# Patient Record
Sex: MALE | Race: WHITE | NOT HISPANIC OR LATINO | Employment: UNEMPLOYED | URBAN - METROPOLITAN AREA
[De-identification: names, ages, dates, MRNs, and addresses within clinical notes are randomized per-mention and may not be internally consistent; named-entity substitution may affect disease eponyms.]

---

## 2017-02-24 ENCOUNTER — TRANSFERRED RECORDS (OUTPATIENT)
Dept: HEALTH INFORMATION MANAGEMENT | Facility: CLINIC | Age: 27
End: 2017-02-24

## 2017-02-24 ENCOUNTER — MEDICAL CORRESPONDENCE (OUTPATIENT)
Dept: HEALTH INFORMATION MANAGEMENT | Facility: CLINIC | Age: 27
End: 2017-02-24

## 2017-02-27 DIAGNOSIS — Z79.899 ENCOUNTER FOR LONG-TERM (CURRENT) USE OF MEDICATIONS: Primary | ICD-10-CM

## 2017-02-27 DIAGNOSIS — Z79.899 ENCOUNTER FOR LONG-TERM (CURRENT) USE OF MEDICATIONS: ICD-10-CM

## 2017-02-27 LAB — PROLACTIN SERPL-MCNC: 58.6 NG/ML (ref 0–15)

## 2017-03-02 ENCOUNTER — DOCUMENTATION ONLY (OUTPATIENT)
Dept: LAB | Facility: CLINIC | Age: 27
End: 2017-03-02

## 2017-03-02 DIAGNOSIS — Z79.899 ENCOUNTER FOR MEDICATION REVIEW: Primary | ICD-10-CM

## 2017-03-02 RX ORDER — RISPERIDONE 3 MG/1
TABLET ORAL
COMMUNITY
Start: 2017-03-02 | End: 2018-08-03 | Stop reason: DRUGHIGH

## 2017-03-02 NOTE — PROGRESS NOTES
From:  [address]  [address]  [phone]  Fax [phone]      To: [employer] [place]    Attn: Veronicajemma Joyner    Fax Number: [phone]    Date: 3/2/2017    RE: Devan DRISCOLL Shaun 1990 MRN [id number]      RESULTS FOR YOUR REVIEW:    Orders Only on 02/27/2017   Component Date Value Ref Range Status     Prolactin 02/27/2017 58.6* 0.0 - 15.0 ng/mL Final       COMMENTS:         Thanks,  Leann Norton    Results faxed by Fidel Bolanos

## 2017-03-20 ENCOUNTER — TRANSFERRED RECORDS (OUTPATIENT)
Dept: HEALTH INFORMATION MANAGEMENT | Facility: CLINIC | Age: 27
End: 2017-03-20

## 2017-03-27 DIAGNOSIS — Z79.899 ENCOUNTER FOR LONG-TERM (CURRENT) USE OF MEDICATIONS: Primary | ICD-10-CM

## 2017-05-08 ENCOUNTER — TELEPHONE (OUTPATIENT)
Dept: FAMILY MEDICINE | Facility: CLINIC | Age: 27
End: 2017-05-08

## 2017-06-09 ENCOUNTER — TRANSFERRED RECORDS (OUTPATIENT)
Dept: HEALTH INFORMATION MANAGEMENT | Facility: CLINIC | Age: 27
End: 2017-06-09

## 2017-07-03 ENCOUNTER — TRANSFERRED RECORDS (OUTPATIENT)
Dept: HEALTH INFORMATION MANAGEMENT | Facility: CLINIC | Age: 27
End: 2017-07-03

## 2017-07-31 ENCOUNTER — TELEPHONE (OUTPATIENT)
Dept: FAMILY MEDICINE | Facility: CLINIC | Age: 27
End: 2017-07-31

## 2017-07-31 NOTE — TELEPHONE ENCOUNTER
Called patient, he lives in a center so he had his worker schedule the appointment for him. I transferred her downstairs to make that appointment for 8/8/17.

## 2017-07-31 NOTE — TELEPHONE ENCOUNTER
----- Message from Yu Gomez MD sent at 7/25/2017 10:44 AM CDT -----  Please Call Devan and ask them to schedule an appointment to complete Paper work for [facility] as I have not seen the pt before.

## 2017-08-08 ENCOUNTER — MEDICAL CORRESPONDENCE (OUTPATIENT)
Dept: HEALTH INFORMATION MANAGEMENT | Facility: CLINIC | Age: 27
End: 2017-08-08

## 2017-08-08 ENCOUNTER — OFFICE VISIT (OUTPATIENT)
Dept: FAMILY MEDICINE | Facility: CLINIC | Age: 27
End: 2017-08-08

## 2017-08-08 VITALS
TEMPERATURE: 99.7 F | BODY MASS INDEX: 30.86 KG/M2 | HEIGHT: 66 IN | SYSTOLIC BLOOD PRESSURE: 112 MMHG | WEIGHT: 192 LBS | DIASTOLIC BLOOD PRESSURE: 75 MMHG | HEART RATE: 111 BPM

## 2017-08-08 DIAGNOSIS — Z00.00 ROUTINE HISTORY AND PHYSICAL EXAMINATION OF ADULT: Primary | ICD-10-CM

## 2017-08-08 LAB
% GRANULOCYTES: 59.3 %G (ref 40–75)
ALBUMIN SERPL BCP-MCNC: 4.1 G/DL (ref 3.5–5)
ALP SERPL-CCNC: 81 U/L (ref 45–120)
ALT SERPL W/O P-5'-P-CCNC: 84 U/L (ref 0–45)
ANION GAP SERPL CALCULATED.3IONS-SCNC: 11 MMOL/L (ref 5–18)
AST SERPL-CCNC: 42 U/L (ref 0–40)
BILIRUB SERPL-MCNC: 0.7 MG/DL (ref 0–1)
BUN SERPL-MCNC: 13 MG/DL (ref 8–22)
CALCIUM SERPL-MCNC: 9 MG/DL (ref 8.5–10.5)
CHLORIDE SERPL-SCNC: 101 MMOL/L (ref 98–107)
CHOLEST SERPL-MCNC: 191 MG/DL
CO2 SERPL-SCNC: 23 MMOL/L (ref 22–31)
CREAT SERPL-MCNC: 1.01 MG/DL (ref 0.7–1.3)
FASTING?: NO
GLUCOSE SERPL-MCNC: 102 MG/DL (ref 70–125)
GRANULOCYTES #: 3.3 K/UL (ref 1.6–8.3)
HCT VFR BLD AUTO: 41.8 % (ref 40–53)
HDLC SERPL-MCNC: 31 MG/DL
HEMOGLOBIN: 13.9 G/DL (ref 13.3–17.7)
LDLC SERPL CALC-MCNC: 96 MG/DL
LYMPHOCYTES # BLD AUTO: 1.7 K/UL (ref 0.8–5.3)
LYMPHOCYTES NFR BLD AUTO: 30.8 %L (ref 20–48)
MCH RBC QN AUTO: 31 PG (ref 26.5–35)
MCHC RBC AUTO-ENTMCNC: 33.3 G/DL (ref 32–36)
MCV RBC AUTO: 93.4 FL (ref 78–100)
MID #: 0.5 K/UL (ref 0–2.2)
MID %: 9.9 %M (ref 0–20)
PLATELET # BLD AUTO: 195 K/UL (ref 150–450)
POTASSIUM SERPL-SCNC: 3.8 MMOL/L (ref 3.5–5)
PROT SERPL-MCNC: 7.4 G/DL (ref 6–8)
RBC # BLD AUTO: 4.5 M/UL (ref 4.4–5.9)
SODIUM SERPL-SCNC: 135 MMOL/L (ref 136–145)
TRIGL SERPL-MCNC: 318 MG/DL
WBC # BLD AUTO: 5.5 K/UL (ref 4–11)

## 2017-08-08 ASSESSMENT — ANXIETY QUESTIONNAIRES
GAD7 TOTAL SCORE: 8
1. FEELING NERVOUS, ANXIOUS, OR ON EDGE: MORE THAN HALF THE DAYS
3. WORRYING TOO MUCH ABOUT DIFFERENT THINGS: NOT AT ALL
6. BECOMING EASILY ANNOYED OR IRRITABLE: NEARLY EVERY DAY
7. FEELING AFRAID AS IF SOMETHING AWFUL MIGHT HAPPEN: MORE THAN HALF THE DAYS
2. NOT BEING ABLE TO STOP OR CONTROL WORRYING: SEVERAL DAYS
5. BEING SO RESTLESS THAT IT IS HARD TO SIT STILL: NOT AT ALL

## 2017-08-08 ASSESSMENT — PATIENT HEALTH QUESTIONNAIRE - PHQ9
5. POOR APPETITE OR OVEREATING: NOT AT ALL
SUM OF ALL RESPONSES TO PHQ QUESTIONS 1-9: 14

## 2017-08-08 NOTE — PROGRESS NOTES
"       SUBJECTIVE       Devan Dunn is a 26 year old  male with a PMH significant for:     Patient Active Problem List   Diagnosis     Health Care Home     Active autistic disorder     Schizophreniform disorder (H)     Depression with anxiety     Autism disorder     Schizoaffective disorder (H)     Schizophrenia (H)     He presents for routine physical and completion of paper work for [facility].     Patient presents with group home staff. He is a poor historian butstates he has no concerns and feel healthy. The staff notes no known issues.     Mr Dunn likes his living situation and feels safe at home.      PMH, Medications and Allergies were reviewed and updated as needed.        REVIEW OF SYSTEMS     CONSTITUTIONAL: NEGATIVE for fever, chills, change in weight  INTEGUMENTARY/SKIN: NEGATIVE for worrisome rashes, moles or lesions  EYES: NEGATIVE for vision changes or irritation  ENT/MOUTH: NEGATIVE for ear, mouth and throat problems  RESP: NEGATIVE for significant cough or SOB  BREAST: NEGATIVE for masses, tenderness or discharge  CV: NEGATIVE for chest pain, palpitations or peripheral edema  GI: NEGATIVE for nausea, abdominal pain, heartburn, or change in bowel habits  : NEGATIVE for frequency, dysuria, or hematuria  MUSCULOSKELETAL: NEGATIVE for significant arthralgias or myalgia  NEURO: NEGATIVE for weakness, dizziness or paresthesias   ENDOCRINE: NEGATIVE for temperature intolerance, skin/hair changes  HEME/ALLERGY: NEGATIVE for bleeding problems        OBJECTIVE     Vitals:    08/08/17 1551   BP: 112/75   Pulse: 111   Temp: 99.7  F (37.6  C)   TempSrc: Oral   Weight: 192 lb (87.1 kg)   Height: 5' 5.5\" (166.4 cm)     Body mass index is 31.46 kg/(m^2).    Constitutional: Awake, alert, cooperative, no apparent distress, and appears stated age.  Eyes: Lids and lashes normal, pupils equal, round and reactive to light, extra ocular muscles intact, sclera clear, conjunctiva normal.  ENT: " Normocephalic, without obvious abnormality, atramatic, sinuses nontender on palpation, external ears without lesions, oral pharynx with moist mucus membranes, tonsils without erythema or exudates, gums normal and good dentition.  Neck: Supple, symmetrical, trachea midline, no adenopathy, thyroid symmetric, not enlarged and no tenderness, skin normal.  Hematologic / Lymphatic: No cervical lymphadenopathy and no supraclavicular lymphadenopathy.  Lungs: No increased work of breathing, good air exchange, clear to auscultation bilaterally, no crackles or wheezing.  Cardiovascular: Regular rate and rhythm, normal S1 and S2, no S3 or S4, and no murmur noted.  Abdomen: normal bowel sounds, soft, non-distended, non-tender, no masses palpated, no hepatosplenomegally.  Musculoskeletal: No redness, warmth, or swelling of the joints.  Full range of motion noted.  Motor strength is 5 out of 5 all extremities bilaterally.  Tone is normal.  Neurologic: Awake, alert, oriented to name, place and time.  Cranial nerves II-XII are grossly intact.  Motor is 5 out of 5 bilaterally.    Neuropsychiatric: Very present, flat affect, impaired insight, limited eye contact  Skin: No rashes, erythema, pallor, petechia or purpura.    PHQ-9: 14 and CANDI-7: 8    ASSESSMENT AND PLAN   Routine history and physical examination of adult: Patient is taking antipsychotics. Needs yearly labs. Has gained 11 lb since last visit. Noted this on group home form.   -     CBC with Diff Plt ([facility])  -     Glycosylated Hgb A1C ([facility])  -     Comprehensive Metabolic ([facility]) - Results > 1 hr  -     Lipid [place] ([facility]) - Results > 1hr      RTC in 1 year for routine physical or sooner if develops new symptoms develop.    I discussed the patient with  who is in agreement with the assessment and plan.   Yu Gomez DO

## 2017-08-08 NOTE — MR AVS SNAPSHOT
After Visit Summary   2017    Devan Dunn    MRN: [id number]           Patient Information     Date Of Birth          1990        Visit Information        Provider Department      2017 3:50 PM Yu Gomez MD [facility]        Today's Diagnoses     Routine history and physical examination of adult    -  1       Follow-ups after your visit        Follow-up notes from your care team     Return in about 1 year (around 2018) for Physical Exam, Lab Work, Routine Visit.      Who to contact     Please call your clinic at [phone] to:    Ask questions about your health    Make or cancel appointments    Discuss your medicines    Learn about your test results    Speak to your doctor   If you have compliments or concerns about an experience at your clinic, or if you wish to file a complaint, please contact [facility] Patient Relations at [phone] or email us at [email or web address]         Additional Information About Your Visit        MyChart Information     [employer] is an electronic gateway that provides easy, online access to your medical records. With [employer], you can request a clinic appointment, read your test results, renew a prescription or communicate with your care team.     To sign up for [employer] visit the website at [email or web address]   You will be asked to enter the access code listed below, as well as some personal information. Please follow the directions to create your username and password.     Your access code is: [email or web address]  Expires: 2017  4:43 PM     Your access code will  in 90 days. If you need help or a new code, please contact your [facility] Clinic or call [phone] for assistance.        Care EveryWhere ID     This is your Care EveryWhere ID. This could be used by other organizations to access your [facility] medical records  [id number]        Your Vitals Were   "   Pulse Temperature Height BMI (Body Mass Index)          111 99.7  F (37.6  C) (Oral) 5' 5.5\" (166.4 cm) 31.46 kg/m2         Blood Pressure from Last 3 Encounters:   08/08/17 112/75   08/04/16 119/81   10/22/15 125/86    Weight from Last 3 Encounters:   08/08/17 192 lb (87.1 kg)   08/04/16 181 lb 4 oz (82.2 kg)   07/27/15 173 lb 11.2 oz (78.8 kg)              We Performed the Following     CBC with Diff Plt ([facility])     Comprehensive Metabolic ([facility]) - Results > 1 hr     Glycosylated Hgb A1C ([facility])     [facility] [place] [facility] - Results > 1hr        Primary Care Provider Office Phone #    Yu Gomez [phone]       [facility] [address] [place] [address]        Equal Access to Services     JUAN PETTY : Hadii seth mills Socarlene, waaxda luqadaha, qaybta kaalmada ethel, ivet mercer . So [facility] [phone].    ATENCIÓN: Si habla español, tiene a cabrera disposición servicios gratuitos de asistencia lingüística. Llame al [phone].    We comply with applicable federal civil rights laws and [place] laws. We do not discriminate on the basis of race, color, national origin, age, disability sex, sexual orientation or gender identity.            Thank you!     Thank you for choosing [facility]  for your care. Our goal is always to provide you with excellent care. Hearing back from our patients is one way we can continue to improve our services. Please take a few minutes to complete the written survey that you may receive in the mail after your visit with us. Thank you!             Your Updated Medication List - Protect others around you: Learn how to safely use, store and throw away your medicines at www.disposemymeds.org.          This list is accurate as of: 8/8/17  5:39 PM.  Always use your most recent med list.                   Brand Name Dispense Instructions for use Diagnosis    benztropine 1 MG tablet    COGENTIN     Take 1 " tablet by mouth 2 times daily.        calcium carbonate 500 MG chewable tablet    TUMS     Take 1 chew tab by mouth 3 times daily as needed.        clonazePAM 0.5 MG tablet    klonoPIN     Take 0.5 mg by mouth 2 times daily as needed        ranitidine 150 MG capsule    ZANTAC    180 capsule    Take 1 capsule (150 mg) by mouth 2 times daily    Gastroesophageal reflux disease without esophagitis       risperiDONE 3 MG tablet    risperDAL     Take 3 mg in the AM and 3.5 mg PM        * traZODone 50 MG tablet    DESYREL     Take by mouth At Bedtime        * traZODone 50 MG tablet    DESYREL    30 tablet    Take 1 tablet (50 mg) by mouth nightly as needed for sleep        * venlafaxine 150 MG 24 hr capsule    EFFEXOR-XR     Take by mouth daily        * venlafaxine 37.5 MG Tb24 24 hr tablet    EFFEXOR-ER    30 each    Take 4 tablets (150 mg) by mouth daily (with breakfast)        * Notice:  This list has 4 medication(s) that are the same as other medications prescribed for you. Read the directions carefully, and ask your doctor or other care provider to review them with you.

## 2017-08-08 NOTE — LETTER
August 8, 2017      Devan Dunn  [address] [address]      To whom it may concern,    Devan Dunn was seen today in clinic.    His active problem list includes:  Patient Active Problem List   Diagnosis     Health Care Home     Active autistic disorder     Schizophreniform disorder (H)     Depression with anxiety     Autism disorder     Schizoaffective disorder (H)     Schizophrenia (H)     His medications include:  Current Outpatient Prescriptions   Medication     risperiDONE (RISPERDAL) 3 MG tablet     ranitidine (ZANTAC) 150 MG capsule     clonazePAM (KLONOPIN) 0.5 MG tablet     traZODone (DESYREL) 50 MG tablet     venlafaxine (EFFEXOR-ER) 37.5 MG TB24     traZODone (DESYREL) 50 MG tablet     venlafaxine (EFFEXOR-XR) 150 MG 24 hr capsule     benztropine (COGENTIN) 1 MG tablet     calcium carbonate (TUMS) 500 MG chewable tablet   Please contact me for questions or concerns.    Sincerely,          Yu Gomez MD

## 2017-08-08 NOTE — LETTER
August 18, 2017      Devan AMERICO Dunn  [address] [address]        Dear Devan,    Please call with results. Let him/the home know that his liver enzymes are slightly elevated. I would like to recheck them in 6 months. Please have them schedule a follow-up appointment in 6 months.     Please see below for your test results.    Resulted Orders   CBC with Diff Plt ([facility])   Result Value Ref Range    WBC 5.5 4.0 - 11.0 K/uL    Lymphocytes # 1.7 0.8 - 5.3 K/uL    % Lymphocytes 30.8 20.0 - 48.0 %L    Mid # 0.5 0.0 - 2.2 K/uL    Mid % 9.9 0.0 - 20.0 %M    GRANULOCYTES # 3.3 1.6 - 8.3 K/uL    % Granulocytes 59.3 40.0 - 75.0 %G    RBC 4.5 4.4 - 5.9 M/uL    Hemoglobin 13.9 13.3 - 17.7 g/dL    Hematocrit 41.8 40.0 - 53.0 %    MCV 93.4 78.0 - 100.0 fL    MCH 31.0 26.5 - 35.0    MCHC 33.3 32.0 - 36.0 g/dL    Platelets 195.0 150.0 - 450.0 K/uL   Glycosylated Hgb A1C ([facility])   Result Value Ref Range    Hemoglobin A1C 5.5 4.2 - 6.1 %    Narrative    Test performed by:  [facility]  [address] [place] [address]   Comprehensive Metabolic ([facility]) - Results > 1 hr   Result Value Ref Range    Sodium 135 (L) 136 - 145 mmol/L    Potassium 3.8 3.5 - 5.0 mmol/L    Chloride 101 98 - 107 mmol/L    CO2, Total 23 22 - 31 mmol/L    Anion Gap 11 5 - 18 mmol/L    Glucose 102 70 - 125 mg/dL    Urea Nitrogen 13 8 - 22 mg/dL    Creatinine 1.01 0.70 - 1.30 mg/dL    GFR Estimate If Black >60 >60 mL/min/1.73m2    GFR Estimate >60 >60 mL/min/1.73m2    Bilirubin Total 0.7 0.0 - 1.0 mg/dL    Calcium 9.0 8.5 - 10.5 mg/dL    Protein Total 7.4 6.0 - 8.0 g/dL    Albumin 4.1 3.5 - 5.0 g/dL    Alkaline Phosphatase 81 45 - 120 U/L    AST (SGOT) 42 (H) 0 - 40 U/L    ALT (SGPT) 84 (H) 0 - 45 U/L    Narrative    Test performed by:  [facility] [address] [place] [address]  Fasting Glucose reference range is 70-99 mg/dL per  American Diabetes Association (ADA) guidelines.   Lipid [place]  ([facility]) - Results > 1hr   Result Value Ref Range    Cholesterol 191 <=199 mg/dL    Triglycerides 318 (H) <=149 mg/dL    HDL Cholesterol 31 (L) >=40 mg/dL    LDL Cholesterol Calculated 96 <=129 mg/dL    Fasting? No     Narrative    Test performed by:  [facility]  [address] [place] [address]       If you have any questions, please call the clinic to make an appointment.    Sincerely,    Yu Gomez MD

## 2017-08-09 LAB — HBA1C MFR BLD: 5.5 % (ref 4.2–6.1)

## 2017-08-09 ASSESSMENT — ANXIETY QUESTIONNAIRES: GAD7 TOTAL SCORE: 8

## 2017-08-17 ENCOUNTER — MEDICAL CORRESPONDENCE (OUTPATIENT)
Dept: HEALTH INFORMATION MANAGEMENT | Facility: CLINIC | Age: 27
End: 2017-08-17

## 2017-09-11 ENCOUNTER — TRANSFERRED RECORDS (OUTPATIENT)
Dept: HEALTH INFORMATION MANAGEMENT | Facility: CLINIC | Age: 27
End: 2017-09-11

## 2017-09-13 ENCOUNTER — TRANSFERRED RECORDS (OUTPATIENT)
Dept: HEALTH INFORMATION MANAGEMENT | Facility: CLINIC | Age: 27
End: 2017-09-13

## 2017-09-13 DIAGNOSIS — Z79.899 ENCOUNTER FOR MEDICATION REVIEW: Primary | ICD-10-CM

## 2017-11-08 ENCOUNTER — TRANSFERRED RECORDS (OUTPATIENT)
Dept: HEALTH INFORMATION MANAGEMENT | Facility: CLINIC | Age: 27
End: 2017-11-08

## 2018-02-08 ENCOUNTER — MEDICAL CORRESPONDENCE (OUTPATIENT)
Dept: HEALTH INFORMATION MANAGEMENT | Facility: CLINIC | Age: 28
End: 2018-02-08

## 2018-07-31 DIAGNOSIS — K21.9 GASTROESOPHAGEAL REFLUX DISEASE WITHOUT ESOPHAGITIS: ICD-10-CM

## 2018-08-03 ENCOUNTER — OFFICE VISIT (OUTPATIENT)
Dept: FAMILY MEDICINE | Facility: CLINIC | Age: 28
End: 2018-08-03

## 2018-08-03 ENCOUNTER — TRANSFERRED RECORDS (OUTPATIENT)
Dept: HEALTH INFORMATION MANAGEMENT | Facility: CLINIC | Age: 28
End: 2018-08-03

## 2018-08-03 VITALS
HEIGHT: 68 IN | WEIGHT: 193 LBS | OXYGEN SATURATION: 96 % | BODY MASS INDEX: 29.25 KG/M2 | TEMPERATURE: 97.9 F | SYSTOLIC BLOOD PRESSURE: 117 MMHG | DIASTOLIC BLOOD PRESSURE: 82 MMHG | RESPIRATION RATE: 20 BRPM | HEART RATE: 119 BPM

## 2018-08-03 DIAGNOSIS — B30.9 ACUTE VIRAL CONJUNCTIVITIS OF RIGHT EYE: Primary | ICD-10-CM

## 2018-08-03 RX ORDER — RISPERIDONE 4 MG/1
4 TABLET ORAL 2 TIMES DAILY
COMMUNITY

## 2018-08-03 RX ORDER — DOXEPIN HYDROCHLORIDE 50 MG/1
50 CAPSULE ORAL AT BEDTIME
COMMUNITY

## 2018-08-03 RX ORDER — TRIHEXYPHENIDYL HYDROCHLORIDE 5 MG/1
5 TABLET ORAL AT BEDTIME
COMMUNITY

## 2018-08-03 ASSESSMENT — PAIN SCALES - GENERAL: PAINLEVEL: NO PAIN (0)

## 2018-08-03 NOTE — NURSING NOTE
Chief Complaint   Patient presents with     RECHECK     [facility]- PINK RIGHT EYE     Abdiel Cope, CMA

## 2018-08-03 NOTE — PATIENT INSTRUCTIONS
Viral Conjunctivitis    Viral conjunctivitis (sometimes called pink eye) is a common infection of the eye. It is very contagious. Touching the infected eye, then touching another person passes this infection. It can also be spread from one eye to the other in this same way. The most common symptoms include redness, discharge from the eye, swollen eyelids, and a gritty or scratchy feeling in the eye.  This condition will take about 7 to 10 days to go away. Artificial tears (available without a prescription) are often recommended to moisten and clean the eyes. Antibiotic eye drops often are not recommended because they will not kill the virus. But sometimes they may be prescribed by eye doctors. This is to prevent a second, bacterial infection.  Home care    Apply a towel soaked in cool water to the affected eye 3 to 4 times a day (just before applying medicine to the eye).    It is common to have mucus drainage during the night, causing the eyelids to become crusted by morning. Use a warm, wet cloth to wipe this away.    Wash any cloths used to clean the eye after one use. Don't reuse them.    If antibiotic medicines are prescribed, take them exactly as directed. Don't stop taking them until you are told to.    You may use acetaminophen or ibuprofen to control pain, unless another medicine was prescribed. (Note: If you have chronic liver or kidney disease, or if you have ever had a stomach ulcer or gastrointestinal bleeding, talk with your healthcare provider before using these medicines.) Aspirin should never be used in anyone under 18 years of age who is ill with a fever. It may cause severe liver damage.    Wash your hands before and after touching the affected eye. This helps to prevent spreading the infection to your other eye and to others.    The infected person should avoid sharing towels, washcloths, and bedding with others. This is to prevent spreading the infection.    This illness is contagious during  the first week. Children with this illness should be kept out of day care and school until the redness clears.  Follow-up care  Follow up with your healthcare provider, or as advised.  When to seek medical advice  Call your healthcare provider right away if any of these occur:    Worsening vision    Increasing pain in the eye    Increasing swelling or redness of the eyelid    Redness spreading to the face around the eye    Large amount of green or yellow drainage from the eye    Severe itching in or around the eye    Fever of 100.4 F (38 C) or higher  Date Last Reviewed: 7/1/2017 2000-2017 [employer]. [address] [place] [address]. All rights reserved. This information is not intended as a substitute for professional medical care. Always follow your healthcare professional's instructions.

## 2018-08-03 NOTE — MR AVS SNAPSHOT
After Visit Summary   8/3/2018    Devan Dunn    MRN: [id number]           Patient Information     Date Of Birth          1990        Visit Information        Provider Department      8/3/2018 2:30 PM Saranya Donohue MD [facility]        Care Instructions      Viral Conjunctivitis    Viral conjunctivitis (sometimes called pink eye) is a common infection of the eye. It is very contagious. Touching the infected eye, then touching another person passes this infection. It can also be spread from one eye to the other in this same way. The most common symptoms include redness, discharge from the eye, swollen eyelids, and a gritty or scratchy feeling in the eye.  This condition will take about 7 to 10 days to go away. Artificial tears (available without a prescription) are often recommended to moisten and clean the eyes. Antibiotic eye drops often are not recommended because they will not kill the virus. But sometimes they may be prescribed by eye doctors. This is to prevent a second, bacterial infection.  Home care    Apply a towel soaked in cool water to the affected eye 3 to 4 times a day (just before applying medicine to the eye).    It is common to have mucus drainage during the night, causing the eyelids to become crusted by morning. Use a warm, wet cloth to wipe this away.    Wash any cloths used to clean the eye after one use. Don't reuse them.    If antibiotic medicines are prescribed, take them exactly as directed. Don't stop taking them until you are told to.    You may use acetaminophen or ibuprofen to control pain, unless another medicine was prescribed. (Note: If you have chronic liver or kidney disease, or if you have ever had a stomach ulcer or gastrointestinal bleeding, talk with your healthcare provider before using these medicines.) Aspirin should never be used in anyone under 18 years of age who is ill with a fever. It may cause severe liver damage.    Wash your  hands before and after touching the affected eye. This helps to prevent spreading the infection to your other eye and to others.    The infected person should avoid sharing towels, washcloths, and bedding with others. This is to prevent spreading the infection.    This illness is contagious during the first week. Children with this illness should be kept out of day care and school until the redness clears.  Follow-up care  Follow up with your healthcare provider, or as advised.  When to seek medical advice  Call your healthcare provider right away if any of these occur:    Worsening vision    Increasing pain in the eye    Increasing swelling or redness of the eyelid    Redness spreading to the face around the eye    Large amount of green or yellow drainage from the eye    Severe itching in or around the eye    Fever of 100.4 F (38 C) or higher  Date Last Reviewed: 7/1/2017 2000-2017 [employer]. [address] [place] [address]. All rights reserved. This information is not intended as a substitute for professional medical care. Always follow your healthcare professional's instructions.                Follow-ups after your visit        Who to contact     Please call your clinic at [phone] to:    Ask questions about your health    Make or cancel appointments    Discuss your medicines    Learn about your test results    Speak to your doctor            Additional Information About Your Visit        [email or web address] Information     [employer] is an electronic gateway that provides easy, online access to your medical records. With [employer], you can request a clinic appointment, read your test results, renew a prescription or communicate with your care team.     To sign up for [employer] visit the website at [email or web address]   You will be asked to enter the access code listed below, as well as some personal information. Please follow the directions to create your username and password.     Your  "access code is: [email or web address]  Expires: 2018  3:02 PM     Your access code will  in 90 days. If you need help or a new code, please contact your [facility] or call [phone] for assistance.        Care EveryWhere ID     This is your Care EveryWhere ID. This could be used by other organizations to access your [place] medical records  [id number]        Your Vitals Were     Pulse Temperature Respirations Height Pulse Oximetry BMI (Body Mass Index)    119 97.9  F (36.6  C) (Oral) 20 5' 8.11\" (173 cm) 96% 29.25 kg/m2       Blood Pressure from Last 3 Encounters:   18 117/82   17 112/75   16 119/81    Weight from Last 3 Encounters:   18 193 lb (87.5 kg)   17 192 lb (87.1 kg)   16 181 lb 4 oz (82.2 kg)              Today, you had the following     No orders found for display         Today's Medication Changes          These changes are accurate as of 8/3/18  3:02 PM.  If you have any questions, ask your nurse or doctor.               These medicines have changed or have updated prescriptions.        Dose/Directions    risperiDONE 4 MG tablet   Commonly known as:  risperDAL   Indication:  Schizophrenia   This may have changed:  Another medication with the same name was removed. Continue taking this medication, and follow the directions you see here.   Changed by:  Saranya Donohue MD        Dose:  4 mg   Take 4 mg by mouth 2 times daily   Refills:  0                Primary Care Provider Office Phone #    Yu Gomez [phone]       [facility] [address] [place] [address]        Equal Access to Services     [facility] AH: Hadii seth Snow, waaxda luqadaha, qaybta kaalmaivet hung. So [facility] [phone].    ATENCIÓN: Si habla español, tiene a cabrera disposición servicios gratuitos de asistencia lingüística. Llame al [phone].    We comply with " applicable federal civil rights laws and [place] laws. We do not discriminate on the basis of race, color, national origin, age, disability, sex, sexual orientation, or gender identity.            Thank you!     Thank you for choosing [facility]  for your care. Our goal is always to provide you with excellent care. Hearing back from our patients is one way we can continue to improve our services. Please take a few minutes to complete the written survey that you may receive in the mail after your visit with us. Thank you!             Your Updated Medication List - Protect others around you: Learn how to safely use, store and throw away your medicines at www.disposemymeds.org.          This list is accurate as of 8/3/18  3:02 PM.  Always use your most recent med list.                   Brand Name Dispense Instructions for use Diagnosis    benztropine 1 MG tablet    COGENTIN     Take 1 tablet by mouth 2 times daily.        calcium carbonate 500 MG chewable tablet    TUMS     Take 1 chew tab by mouth 3 times daily as needed.        clonazePAM 0.5 MG tablet    klonoPIN     Take 0.5 mg by mouth 2 times daily as needed        doxepin 50 MG capsule    SINEquan     Take 50 mg by mouth At Bedtime        ranitidine 150 MG capsule    ZANTAC    180 capsule    Take 1 capsule (150 mg) by mouth 2 times daily    Gastroesophageal reflux disease without esophagitis       risperiDONE 4 MG tablet    risperDAL     Take 4 mg by mouth 2 times daily        traZODone 50 MG tablet    DESYREL    30 tablet    Take 1 tablet (50 mg) by mouth nightly as needed for sleep        * venlafaxine 150 MG 24 hr capsule    EFFEXOR-XR     Take by mouth daily        * venlafaxine 37.5 MG Tb24 24 hr tablet    EFFEXOR-ER    30 each    Take 4 tablets (150 mg) by mouth daily (with breakfast)        * Notice:  This list has 2 medication(s) that are the same as other medications prescribed for you. Read the directions carefully, and ask your doctor or other  care provider to review them with you.

## 2018-08-03 NOTE — PROGRESS NOTES
Preceptor Attestation:   Patient seen, evaluated and discussed with the resident. I have verified the content of the note, which accurately reflects my assessment of the patient and the plan of care.   Supervising Physician:  Julio Cesar Amaya MD

## 2018-08-03 NOTE — PROGRESS NOTES
"Chief Complaint   Patient presents with     RECHECK     UMP- PINK RIGHT EYE              SUBJECTIVE       Devan Dunn is a 27 year old  male with a PMH significant for:     Patient Active Problem List   Diagnosis     Health Care Home     Active autistic disorder     Schizophreniform disorder (H)     Depression with anxiety     Autism disorder     Schizoaffective disorder (H)     Schizophrenia (H)     He presents for evaluation of a right red eye.  He presents with his group home staff.  Group home staff notes that this redness was noted yesterday.  There are no other members of the group home that have this, the patient does attend a day program.  There was some crust noted on the eye last night, however there was no recurrence of the crust today or any purulent discharge.  Patient denies any eye irritation, feeling of sand, burning, discharge, blurred vision.  Patient is otherwise feeling well.  He denies any fevers, chills, headache, lightheadedness, nausea, chest pain, shortness of breath, belly pain, rash.  Also denies any eye pain or difficulty hearing or throat pain.    PMH, Medications and Allergies were reviewed with patient.          OBJECTIVE       Physical Exam:  /82  Pulse 119  Temp 97.9  F (36.6  C) (Oral)  Resp 20  Ht 5' 8.11\" (173 cm)  Wt 193 lb (87.5 kg)  SpO2 96%  BMI 29.25 kg/m2  Body mass index is 29.25 kg/(m^2).      General: Adult male sitting on exam table, NAD  Head: Normocephalic  Ears: Tympanic membranes pearly with good light reflex no erythema  Eyes: Medial half of right eye with red conjunctival.  No discharge noted.  Left eye normal.  Without erythema.  Nose: Nasal mucosa nonerythematous, no drainage noted  Oropharynx: Moist mucous membranes, no erythema  Neck: Supple  CV: RRR, no m/r/g  Pulm: CTAB, no wheezing, rhonchi or crackles  Skin: No rash      ASSESSMENT AND PLAN     Devan was seen today for recheck.    Diagnoses and all orders for this visit:    Acute viral " conjunctivitis of right eye.  Patient presents with 1 day of erythema of his right eye.  No known sick contacts.  No purulent discharge or other systemic symptoms.  History and exam most consistent with viral conjunctivitis.  Counseled on good hand hygiene as well as the use of artificial tears 1-2 drops as needed for relief of any irritation.    I precepted with Dr. Yaquelin MD  PGY-3, [facility]   Pager: [phone]

## 2018-08-16 ENCOUNTER — MEDICAL CORRESPONDENCE (OUTPATIENT)
Dept: HEALTH INFORMATION MANAGEMENT | Facility: CLINIC | Age: 28
End: 2018-08-16

## 2018-08-16 RX ORDER — TRAZODONE HYDROCHLORIDE 150 MG/1
150 TABLET ORAL
COMMUNITY
Start: 2018-08-16 | End: 2019-04-15

## 2018-08-21 ENCOUNTER — OFFICE VISIT (OUTPATIENT)
Dept: FAMILY MEDICINE | Facility: CLINIC | Age: 28
End: 2018-08-21

## 2018-08-21 VITALS
TEMPERATURE: 97.8 F | HEART RATE: 111 BPM | RESPIRATION RATE: 16 BRPM | DIASTOLIC BLOOD PRESSURE: 83 MMHG | WEIGHT: 197 LBS | OXYGEN SATURATION: 98 % | SYSTOLIC BLOOD PRESSURE: 120 MMHG | BODY MASS INDEX: 30.92 KG/M2 | HEIGHT: 67 IN

## 2018-08-21 DIAGNOSIS — Z00.00 ENCOUNTER FOR ROUTINE ADULT HEALTH EXAMINATION WITHOUT ABNORMAL FINDINGS: ICD-10-CM

## 2018-08-21 DIAGNOSIS — Z51.81 ENCOUNTER FOR THERAPEUTIC DRUG MONITORING: Primary | ICD-10-CM

## 2018-08-21 ASSESSMENT — ANXIETY QUESTIONNAIRES
2. NOT BEING ABLE TO STOP OR CONTROL WORRYING: MORE THAN HALF THE DAYS
3. WORRYING TOO MUCH ABOUT DIFFERENT THINGS: NEARLY EVERY DAY
7. FEELING AFRAID AS IF SOMETHING AWFUL MIGHT HAPPEN: NOT AT ALL
IF YOU CHECKED OFF ANY PROBLEMS ON THIS QUESTIONNAIRE, HOW DIFFICULT HAVE THESE PROBLEMS MADE IT FOR YOU TO DO YOUR WORK, TAKE CARE OF THINGS AT HOME, OR GET ALONG WITH OTHER PEOPLE: SOMEWHAT DIFFICULT
1. FEELING NERVOUS, ANXIOUS, OR ON EDGE: NOT AT ALL
6. BECOMING EASILY ANNOYED OR IRRITABLE: MORE THAN HALF THE DAYS
5. BEING SO RESTLESS THAT IT IS HARD TO SIT STILL: NOT AT ALL
GAD7 TOTAL SCORE: 8

## 2018-08-21 ASSESSMENT — PATIENT HEALTH QUESTIONNAIRE - PHQ9: 5. POOR APPETITE OR OVEREATING: SEVERAL DAYS

## 2018-08-21 NOTE — MR AVS SNAPSHOT
After Visit Summary   8/21/2018    Devan Dunn    MRN: [id number]           Patient Information     Date Of Birth          1990        Visit Information        Provider Department      8/21/2018 2:30 PM Deborah Canales MD [facility]        Today's Diagnoses     Encounter for therapeutic drug monitoring    -  1    Encounter for routine adult health examination without abnormal findings          Care Instructions    Double check with your psychiatrist that they are doing blood tests that are needed for your medications.  If they don't have a lab, we are happy to do them in our lab for you and send the results to [facility].    We did an EKG today that showed:       Preventive Health Recommendations  Male Ages 26 - 39    Yearly exam:             See your health care provider every year in order to  o   Review health changes.   o   Discuss preventive care.    o   Review your medicines if your doctor has prescribed any.    You should be tested each year for STDs (sexually transmitted diseases), if you re at risk.     After age 35, talk to your provider about cholesterol testing. If you are at risk for heart disease, have your cholesterol tested at least every 5 years.     If you are at risk for diabetes, you should have a diabetes test (fasting glucose).  Shots: Get a flu shot each year. Get a tetanus shot every 10 years.     Nutrition:    Eat at least 5 servings of fruits and vegetables daily.     Eat whole-grain bread, whole-wheat pasta and brown rice instead of white grains and rice.     Get adequate Calcium and Vitamin D.     Lifestyle    Exercise for at least 150 minutes a week (30 minutes a day, 5 days a week). This will help you control your weight and prevent disease.     Limit alcohol to one drink per day.     No smoking.     Wear sunscreen to prevent skin cancer.     See your dentist every six months for an exam and cleaning.             Follow-ups after your visit        Who to  "contact     Please call your clinic at [phone] to:    Ask questions about your health    Make or cancel appointments    Discuss your medicines    Learn about your test results    Speak to your doctor            Additional Information About Your Visit        MyChart Information     [employer] is an electronic gateway that provides easy, online access to your medical records. With [employer], you can request a clinic appointment, read your test results, renew a prescription or communicate with your care team.     To sign up for [employer] visit the website at [email or web address]   You will be asked to enter the access code listed below, as well as some personal information. Please follow the directions to create your username and password.     Your access code is: [email or web address]  Expires: 2018  3:02 PM     Your access code will  in 90 days. If you need help or a new code, please contact your [facility] or call [phone] for assistance.        Care EveryWhere ID     This is your Care EveryWhere ID. This could be used by other organizations to access your [place] medical records  [id number]        Your Vitals Were     Pulse Temperature Respirations Height Pulse Oximetry BMI (Body Mass Index)    111 97.8  F (36.6  C) (Oral) 16 5' 7\" (170.2 cm) 98% 30.85 kg/m2       Blood Pressure from Last 3 Encounters:   18 120/83   18 117/82   17 112/75    Weight from Last 3 Encounters:   18 197 lb (89.4 kg)   18 193 lb (87.5 kg)   17 192 lb (87.1 kg)              We Performed the Following     EKG 12-lead complete w/read - Clinics        Primary Care Provider Office Phone #    Yu Gomez [phone]       [facility] [address] [place] [address]        Equal Access to Services     JUAN PETTY AH: Danielito Snow, waaxda luqadaha, qaybta kaalmathais [place], ivet calderón. So wa " [phone].    ATENCIÓN: Si luis ta, tiene a cabrera disposición servicios gratuitos de asistencia lingüística. Heena abrams [phone].    We comply with applicable federal civil rights laws and [place] laws. We do not discriminate on the basis of race, color, national origin, age, disability, sex, sexual orientation, or gender identity.            Thank you!     Thank you for choosing [facility]  for your care. Our goal is always to provide you with excellent care. Hearing back from our patients is one way we can continue to improve our services. Please take a few minutes to complete the written survey that you may receive in the mail after your visit with us. Thank you!             Your Updated Medication List - Protect others around you: Learn how to safely use, store and throw away your medicines at www.disposemymeds.org.          This list is accurate as of 8/21/18  3:21 PM.  Always use your most recent med list.                   Brand Name Dispense Instructions for use Diagnosis    benztropine 1 MG tablet    COGENTIN     Take 1 tablet by mouth 2 times daily.        clonazePAM 0.5 MG tablet    klonoPIN     Take 0.5 mg by mouth 2 times daily as needed        doxepin 50 MG capsule    SINEquan     Take 50 mg by mouth At Bedtime        ranitidine 150 MG capsule    ZANTAC     Take 150 mg by mouth 2 times daily        risperiDONE 4 MG tablet    risperDAL     Take 4 mg by mouth 2 times daily        traZODone 150 MG tablet    DESYREL     Take 1 tablet (150 mg) by mouth nightly as needed for sleep        trihexyphenidyl 5 MG tablet    ARTANE     Take 5 mg by mouth At Bedtime        * venlafaxine 150 MG 24 hr capsule    EFFEXOR-XR     Take 150 mg by mouth daily        * venlafaxine 37.5 MG Tb24 24 hr tablet    EFFEXOR-ER    30 each    Take 4 tablets (150 mg) by mouth daily (with breakfast)        * Notice:  This list has 2 medication(s) that are the same as other medications prescribed for you. Read the  directions carefully, and ask your doctor or other care provider to review them with you.

## 2018-08-21 NOTE — PROGRESS NOTES
Male Physical Note      Concerns today:   Chief Complaint   Patient presents with     Physical     Complete physical exam     Review of Systems  Positive: sleeping pattern issues, but not bothersome; wakes sometimes in the middle of the night then slow to wake in morning  Negative:  -- Eye: vision change, double vision, red eyes  -- HEENT: hearing change, sinus pain, throat pain  -- MSK: joint pain, muscle pain, swelling  -- Constitutional: fever, chills, weight change, tiredness  -- GI: nausea, vomiting, heartburn, diarrhea, constipation, stomach pain  -- Heme: concerning bumps, bleeding problems  -- CV: chest pain, heart flutters, pain with walking  -- Psych: depression, anxiety  -- Neuro: headache, loss of strength or feeling, numbness or tingling, dizziness  -- : change in urine, leakage of urine, sexual problems  -- Resp: wheezing, cough, difficulty breathing  -- Endo: very sensitivity to heat or cold, very thirsty    Past Medical History:   Diagnosis Date     Autism disorder      Depressive disorder      Schizophrenia (H)         Family History   Problem Relation Age of Onset     Diabetes Brother      Coronary Artery Disease No family hx of      Cancer No family hx of    Reviewed no other significant FH           Family History and past Medical History reviewed and unchanged/updated.    Social History   Substance Use Topics     Smoking status: Never Smoker     Smokeless tobacco: Never Used     Alcohol use No     Single.  No children.  Unemployed.  Lives in a group home.  Enjoys playing video games.  Accompanied today by [employer] (home employee).    Has anyone hurt you physically, for example by pushing, hitting, slapping or kicking you or forcing you to have sex? Denies  Do you feel threatened or controlled by a partner, ex-partner or anyone in your life? Denies    RISK BEHAVIORS AND HEALTHY HABITS:  Tobacco Use/Smoking: None  Illicit Drug Use: None  ETOH: None  Sexually Active: No  Diet (5-7 servings of  fruits/veg daily): Yes   Exercise (30 min accumulated most days): Yes - enjoys walking around the block  Dental Care: Yes   Calcium 1500 mg/d:  Yes  Seat Belt Use: Yes     HIV screening:  Testing not indicated   Diabetes screening:   Lab Results   Component Value Date    A1C 5.5 08/08/2017    A1C 5.2 08/04/2016    A1C 5.2 07/27/2015    A1C 5.1 07/14/2014    A1C 5.3 06/06/2012     Lipid screening:  Cholesterol   Date Value Ref Range Status   08/08/2017 191 <=199 mg/dL Final   07/27/2015 181.5 0.0 - 200.0 mg/dL Final   07/14/2014 186 <=199 mg/dL Final   03/22/2012 182 <200 mg/dL Final     LDL Cholesterol Calculated   Date Value Ref Range Status   08/08/2017 96 <=129 mg/dL Final   07/27/2015 105 0 - 129 mg/dL Final       Immunization History   Administered Date(s) Administered     DTAP (<7y) 01/26/1989, 05/22/1989, 07/27/1989, 03/26/1990, 11/11/1993     FLU 6-35 months 11/11/2015     HEPA 06/28/2012, 08/10/2012     Hep B, Peds or Adolescent 12/05/2002     HepA-Peds, Unspecified 06/28/2012     HepA-ped 2 Dose 08/10/2006     HepB 09/14/2000, 07/06/2001, 10/30/2001, 12/05/2002     HepB, Unspecified 09/14/2000, 07/06/2001, 10/30/2001     Hib (PRP-T) 01/26/1989, 05/22/1989, 07/27/1989, 03/26/1990, 11/02/1990     Hib, Unspecified 01/26/1989, 05/22/1989, 07/27/1989, 03/26/1990, 11/02/1990     Historical DTP/aP 01/26/1989, 05/22/1989, 07/27/1989, 03/26/1990, 11/11/1993     Influenza (IIV3) PF 01/16/2012, 09/30/2013     Influenza Vaccine IM 3yrs+ 4 Valent IIV4 09/29/2016, 10/10/2017     MMR 03/26/1990, 09/14/2000     Mantoux Tuberculin Skin Test 06/28/2012, 07/03/2012     Meningococcal (Menomune ) 08/10/2006     OPV, trivalent, live 01/26/1989, 05/22/1989, 07/27/1989, 11/11/1993     TD (ADULT, 7+) 09/14/2000     TDAP Vaccine (Adacel) 06/28/2012     Td (Adult), Adsorbed 09/14/2000     Tdap (Adacel,Boostrix) 06/28/2012   Reviewed Immunization Record Today    EXAMINATION:  /83 (BP Location: Left arm, Patient Position:  "Sitting, Cuff Size: Adult Regular)  Pulse 111  Temp 97.8  F (36.6  C) (Oral)  Resp 16  Ht 5' 7\" (170.2 cm)  Wt 197 lb (89.4 kg)  SpO2 98%  BMI 30.85 kg/m2  GENERAL: healthy, alert and no distress  EYES: Eyes grossly normal to inspection, extraocular movements - intact, and PERRL  HENT: ear canals- normal with some excess cerumen; TMs- normal; Nose- normal; Mouth- no ulcers, no lesions, small teeth  NECK: no tenderness, no adenopathy, no asymmetry, no stiffness  RESP: lungs clear to auscultation - no rales, no rhonchi, no wheezes  CV: regular rates and rhythm, normal S1 S2, no S3 or S4 and no murmur, no click or rub -  ABDOMEN: soft, no tenderness, no  hepatosplenomegaly, no masses, normal bowel sounds  MS: extremities- no gross deformities noted, no edema  SKIN: no suspicious lesions, no rashes  NEURO: strength and tone- normal, sensory exam- grossly normal, mentation- intact, speech- normal, reflexes- symmetric  BACK: no CVA tenderness, no paralumbar tenderness  PSYCH: Alert and oriented times 3; speech- coherent , normal rate and volume; able to articulate thoughts, no tangential thoughts, no hallucinations or delusions, affect- slightly flat  LYMPHATICS: ant. cervical- normal, post. cervical- normal, axillary- normal, supraclavicular- normal, inguinal- normal    Assessment & Plan  Cancer screening:  -- Not indicated based on age.    Disease screening:  -- Obesity (BMI 31).  Just had lipid and diabetes screen done last year.  These have always been normal.  Recommend repeat screening more on a q3-5 year schedule.  Not indicated today.  -- Vaccines: up to date.    Other:  -- Multiple psychiatric medications for potential of QT prolongation.  Normal EKG w/ QTc 418.  Recommend following up for psychiatry for routine blood monitoring associated with these meds.    "

## 2018-08-21 NOTE — PATIENT INSTRUCTIONS
Double check with your psychiatrist that they are doing blood tests that are needed for your medications.  If they don't have a lab, we are happy to do them in our lab for you and send the results to [facility].    We did an EKG today that showed:       Preventive Health Recommendations  Male Ages 26 - 39    Yearly exam:             See your health care provider every year in order to  o   Review health changes.   o   Discuss preventive care.    o   Review your medicines if your doctor has prescribed any.    You should be tested each year for STDs (sexually transmitted diseases), if you re at risk.     After age 35, talk to your provider about cholesterol testing. If you are at risk for heart disease, have your cholesterol tested at least every 5 years.     If you are at risk for diabetes, you should have a diabetes test (fasting glucose).  Shots: Get a flu shot each year. Get a tetanus shot every 10 years.     Nutrition:    Eat at least 5 servings of fruits and vegetables daily.     Eat whole-grain bread, whole-wheat pasta and brown rice instead of white grains and rice.     Get adequate Calcium and Vitamin D.     Lifestyle    Exercise for at least 150 minutes a week (30 minutes a day, 5 days a week). This will help you control your weight and prevent disease.     Limit alcohol to one drink per day.     No smoking.     Wear sunscreen to prevent skin cancer.     See your dentist every six months for an exam and cleaning.

## 2018-08-22 ASSESSMENT — PATIENT HEALTH QUESTIONNAIRE - PHQ9: SUM OF ALL RESPONSES TO PHQ QUESTIONS 1-9: 4

## 2018-08-22 ASSESSMENT — ANXIETY QUESTIONNAIRES: GAD7 TOTAL SCORE: 8

## 2018-11-13 DIAGNOSIS — Z79.899 ENCOUNTER FOR LONG-TERM (CURRENT) USE OF MEDICATIONS: ICD-10-CM

## 2018-11-13 LAB
CHOLEST SERPL-MCNC: 227 MG/DL
FASTING?: YES
FASTING?: YES
GLUCOSE SERPL-MCNC: 90 MG/DL (ref 70–125)
HDLC SERPL-MCNC: 41 MG/DL
LDLC SERPL CALC-MCNC: 123 MG/DL
TRIGL SERPL-MCNC: 313 MG/DL

## 2018-11-14 LAB — HBA1C MFR BLD: 5.8 % (ref 4.2–6.1)

## 2018-11-20 ENCOUNTER — DOCUMENTATION ONLY (OUTPATIENT)
Dept: FAMILY MEDICINE | Facility: CLINIC | Age: 28
End: 2018-11-20

## 2018-11-20 NOTE — PROGRESS NOTES
From:  [address]  [address]  [phone]  Fax [phone]      To: [facility] [employer] [place]    Attn: Veronica Joyner CNP    Fax Number:[phone]    Date: 11/20/2018    RE: Devan Dunn 1990 MRN [id number]      RESULTS FOR YOUR REVIEW:    Orders Only on 11/13/2018   Component Date Value Ref Range Status     Glucose 11/13/2018 90  70 - 125 mg/dL Final     Fasting? 11/13/2018 Yes   Final     Cholesterol 11/13/2018 227* <=199 mg/dL Final     Triglycerides 11/13/2018 313* <=149 mg/dL Final     HDL Cholesterol 11/13/2018 41  >=40 mg/dL Final     LDL Cholesterol Calculated 11/13/2018 123  <=129 mg/dL Final     Fasting? 11/13/2018 Yes   Final     Hemoglobin A1C 11/13/2018 5.8  4.2 - 6.1 % Final       COMMENTS:         Thanks,  Yu Gomez    Results faxed by Prosper Healy

## 2019-03-07 ENCOUNTER — TRANSFERRED RECORDS (OUTPATIENT)
Dept: HEALTH INFORMATION MANAGEMENT | Facility: CLINIC | Age: 29
End: 2019-03-07

## 2019-04-15 ENCOUNTER — OFFICE VISIT (OUTPATIENT)
Dept: FAMILY MEDICINE | Facility: CLINIC | Age: 29
End: 2019-04-15
Payer: COMMERCIAL

## 2019-04-15 VITALS
TEMPERATURE: 98.7 F | HEIGHT: 67 IN | WEIGHT: 210 LBS | DIASTOLIC BLOOD PRESSURE: 81 MMHG | HEART RATE: 83 BPM | SYSTOLIC BLOOD PRESSURE: 113 MMHG | OXYGEN SATURATION: 96 % | RESPIRATION RATE: 18 BRPM | BODY MASS INDEX: 32.96 KG/M2

## 2019-04-15 DIAGNOSIS — Z13.220 SCREENING FOR LIPID DISORDERS: ICD-10-CM

## 2019-04-15 DIAGNOSIS — E66.811 CLASS 1 OBESITY DUE TO EXCESS CALORIES WITHOUT SERIOUS COMORBIDITY WITH BODY MASS INDEX (BMI) OF 33.0 TO 33.9 IN ADULT: ICD-10-CM

## 2019-04-15 DIAGNOSIS — Z00.00 ROUTINE GENERAL MEDICAL EXAMINATION AT A HEALTH CARE FACILITY: ICD-10-CM

## 2019-04-15 DIAGNOSIS — Z51.81 ENCOUNTER FOR MEDICATION MONITORING: Primary | ICD-10-CM

## 2019-04-15 DIAGNOSIS — E66.09 CLASS 1 OBESITY DUE TO EXCESS CALORIES WITHOUT SERIOUS COMORBIDITY WITH BODY MASS INDEX (BMI) OF 33.0 TO 33.9 IN ADULT: ICD-10-CM

## 2019-04-15 LAB
% GRANULOCYTES: 60.8 %G (ref 40–75)
ALBUMIN SERPL-MCNC: 4.6 MG/DL (ref 3.9–5.1)
ALP SERPL-CCNC: 91.8 U/L (ref 40–150)
ALT SERPL-CCNC: 385.3 U/L (ref 0–45)
AST SERPL-CCNC: 240.8 U/L (ref 0–55)
BILIRUB SERPL-MCNC: 0.8 MG/DL (ref 0.2–1.3)
BUN SERPL-MCNC: 13.6 MG/DL (ref 7–21)
CALCIUM SERPL-MCNC: 10.2 MG/DL (ref 8.5–10.1)
CHLORIDE SERPLBLD-SCNC: 100 MMOL/L (ref 98–110)
CHOLEST SERPL-MCNC: 233.4 MG/DL (ref 0–200)
CHOLEST/HDLC SERPL: 5.5 {RATIO} (ref 0–5)
CO2 SERPL-SCNC: 30.4 MMOL/L (ref 20–32)
CREAT SERPL-MCNC: 1.1 MG/DL (ref 0.7–1.3)
GFR SERPL CREATININE-BSD FRML MDRD: 83.5 ML/MIN/1.7 M2
GLUCOSE SERPL-MCNC: 92.1 MG'DL (ref 70–99)
GRANULOCYTES #: 3.6 K/UL (ref 1.6–8.3)
HBA1C MFR BLD: 5.2 % (ref 4.1–5.7)
HCT VFR BLD AUTO: 44.6 % (ref 40–53)
HDLC SERPL-MCNC: 42.3 MG/DL
HEMOGLOBIN: 14.2 G/DL (ref 13.3–17.7)
LDLC SERPL CALC-MCNC: 150 MG/DL (ref 0–129)
LYMPHOCYTES # BLD AUTO: 1.7 K/UL (ref 0.8–5.3)
LYMPHOCYTES NFR BLD AUTO: 28.9 %L (ref 20–48)
MCH RBC QN AUTO: 29.8 PG (ref 26.5–35)
MCHC RBC AUTO-ENTMCNC: 31.8 G/DL (ref 32–36)
MCV RBC AUTO: 93.5 FL (ref 78–100)
MID #: 0.6 K/UL (ref 0–2.2)
MID %: 10.3 %M (ref 0–20)
PLATELET # BLD AUTO: 216 K/UL (ref 150–450)
POTASSIUM SERPL-SCNC: 4.4 MMOL/DL (ref 3.2–4.6)
PROT SERPL-MCNC: 7.7 G/DL (ref 6.8–8.8)
RBC # BLD AUTO: 4.8 M/UL (ref 4.4–5.9)
SODIUM SERPL-SCNC: 137.2 MMOL/L (ref 132–142)
TRIGL SERPL-MCNC: 203.5 MG/DL (ref 0–150)
VLDL CHOLESTEROL: 40.7 MG/DL (ref 7–32)
WBC # BLD AUTO: 6 K/UL (ref 4–11)

## 2019-04-15 ASSESSMENT — ANXIETY QUESTIONNAIRES
6. BECOMING EASILY ANNOYED OR IRRITABLE: NOT AT ALL
5. BEING SO RESTLESS THAT IT IS HARD TO SIT STILL: NOT AT ALL
2. NOT BEING ABLE TO STOP OR CONTROL WORRYING: NOT AT ALL
IF YOU CHECKED OFF ANY PROBLEMS ON THIS QUESTIONNAIRE, HOW DIFFICULT HAVE THESE PROBLEMS MADE IT FOR YOU TO DO YOUR WORK, TAKE CARE OF THINGS AT HOME, OR GET ALONG WITH OTHER PEOPLE: EXTREMELY DIFFICULT
7. FEELING AFRAID AS IF SOMETHING AWFUL MIGHT HAPPEN: NOT AT ALL
3. WORRYING TOO MUCH ABOUT DIFFERENT THINGS: MORE THAN HALF THE DAYS
1. FEELING NERVOUS, ANXIOUS, OR ON EDGE: MORE THAN HALF THE DAYS
GAD7 TOTAL SCORE: 7

## 2019-04-15 ASSESSMENT — PATIENT HEALTH QUESTIONNAIRE - PHQ9: 5. POOR APPETITE OR OVEREATING: NEARLY EVERY DAY

## 2019-04-15 ASSESSMENT — MIFFLIN-ST. JEOR: SCORE: 1867.21

## 2019-04-15 NOTE — PROGRESS NOTES
Preceptor Attestation:   Patient seen, evaluated and discussed with the resident. I have verified the content of the note, which accurately reflects my assessment of the patient and the plan of care.   Supervising Physician:  Devan Hammonds MD

## 2019-04-15 NOTE — LETTER
April 16, 2019      Devan Dunn  [address] [address]        Dear Negro Hartman! Devan's liver enzymes are elevated this year. This could mean multiple things and needs further workup. Please schedule a follow up appointment to discuss, get further labs, and get an ultrasound to evaluate the liver. His lipids remain elevated are generally unchanged. Please ensure the psychiatrist gets a copy. Patient has no concerns for diabetes and his blood cells are normal. Please call if you have any questions.     Please see below for your test results.    Resulted Orders   Comprehensive Metabolic Panel ([facility])   Result Value Ref Range    Albumin 4.6 3.9 - 5.1 mg/dL    Alkaline Phosphatase 91.8 40.0 - 150.0 U/L    .3 (H) 0.0 - 45.0 U/L    .8 (H) 0.0 - 55.0 U/L    Bilirubin Total 0.8 0.2 - 1.3 mg/dL    Urea Nitrogen 13.6 7.0 - 21.0 mg/dL    Calcium 10.2 (H) 8.5 - 10.1 mg/dL    Chloride 100.0 98.0 - 110.0 mmol/L    Carbon Dioxide 30.4 20.0 - 32.0 mmol/L    Creatinine 1.1 0.7 - 1.3 mg/dL    Glucose 92.1 70.0 - 99.0 mg'dL    Potassium 4.4 3.2 - 4.6 mmol/dL    Sodium 137.2 132.0 - 142.0 mmol/L    Protein Total 7.7 6.8 - 8.8 g/dL    GFR Estimate 83.5 >60.0 mL/min/1.7 m2    GFR Estimate If Black >90 >60.0 mL/min/1.7 m2   CBC with Diff Plt ([facility])   Result Value Ref Range    WBC 6.0 4.0 - 11.0 K/uL    Lymphocytes # 1.7 0.8 - 5.3 K/uL    % Lymphocytes 28.9 20.0 - 48.0 %L    Mid # 0.6 0.0 - 2.2 K/uL    Mid % 10.3 0.0 - 20.0 %M    GRANULOCYTES # 3.6 1.6 - 8.3 K/uL    % Granulocytes 60.8 40.0 - 75.0 %G    RBC 4.8 4.4 - 5.9 M/uL    Hemoglobin 14.2 13.3 - 17.7 g/dL    Hematocrit 44.6 40.0 - 53.0 %    MCV 93.5 78.0 - 100.0 fL    MCH 29.8 26.5 - 35.0    MCHC 31.8 (L) 32.0 - 36.0 g/dL    Platelets 216.0 150.0 - 450.0 K/uL   Lipid Panel ([facility])   Result Value Ref Range    Cholesterol 233.4 (H) 0.0 - 200.0 mg/dL    Cholesterol/HDL Ratio 5.5 (H) 0.0 - 5.0    HDL Cholesterol 42.3 >40.0 mg/dL    LDL  Cholesterol Calculated 150 (H) 0 - 129 mg/dL    Triglycerides 203.5 (H) 0.0 - 150.0 mg/dL    VLDL Cholesterol 40.7 (H) 7.0 - 32.0 mg/dL   Hemoglobin A1c ([facility])   Result Value Ref Range    Hemoglobin A1C 5.2 4.1 - 5.7 %       If you have any questions, please call the clinic to make an appointment.    Sincerely,    Yu Gomez MD

## 2019-04-15 NOTE — PROGRESS NOTES
Male Physical Note      Concerns today: Patient lives in group home. Here with care taker. Stays up late. Monitoring for drug levels      ROS:                      CONSTITUTIONAL: no fatigue, no unexpected change in weight  SKIN: no worrisome rashes, no worrisome moles, no worrisome lesions  EYES: no acute vision problems or changes  ENT: no ear problems, no mouth problems, no throat problems  RESP: no significant cough, no shortness of breath  CV: no chest pain, no palpitations, no new or worsening peripheral edema  GI: no nausea, no vomiting, no constipation, no diarrhea    Past Medical History:   Diagnosis Date     Autism disorder      Depressive disorder      Schizophrenia (H)         Family History   Problem Relation Age of Onset     Diabetes Brother      Coronary Artery Disease No family hx of      Cancer No family hx of      Reviewed no other significant FH           Family History and past Medical History reviewed and unchanged/updated.    Social History     Tobacco Use     Smoking status: Never Smoker     Smokeless tobacco: Never Used   Substance Use Topics     Alcohol use: No     Single  Children ? no    Has anyone hurt you physically, for example by pushing, hitting, slapping or kicking you or forcing you to have sex? Denies  Do you feel threatened or controlled by a partner, ex-partner or anyone in your life? Denies    RISK BEHAVIORS AND HEALTHY HABITS:  Tobacco Use/Smoking: None  Illicit Drug Use: None  ETOH: None  Do you use alcohol? No  Sexually Active: No  Diet (5-7 servings of fruits/veg daily): Yes, likes fruits, Given increase in BMI discussed avoiding sodas and increasing fruirts and veggies for snacks  Exercise (30 min accumulated most days):Yes likes walking, difficult to do in the winter. Encouraged walking throughout the summer  Dental Care: Yes   Calcium 1500 mg/d:  No  Seat Belt Use: Yes        Immunization History   Administered Date(s) Administered     DTAP (<7y) 01/26/1989, 05/22/1989,  "07/27/1989, 03/26/1990, 11/11/1993     FLU 6-35 months 11/11/2015     HEPA 06/28/2012, 08/10/2012     Hep B, Peds or Adolescent 12/05/2002     HepA-Peds, Unspecified 06/28/2012     HepA-ped 2 Dose 08/10/2006     HepB 09/14/2000, 07/06/2001, 10/30/2001, 12/05/2002     HepB, Unspecified 09/14/2000, 07/06/2001, 10/30/2001     Hib (PRP-T) 01/26/1989, 05/22/1989, 07/27/1989, 03/26/1990, 11/02/1990     Hib, Unspecified 01/26/1989, 05/22/1989, 07/27/1989, 03/26/1990, 11/02/1990     Historical DTP/aP 01/26/1989, 05/22/1989, 07/27/1989, 03/26/1990, 11/11/1993     Influenza (IIV3) PF 01/16/2012, 09/30/2013     Influenza Vaccine IM 3yrs+ 4 Valent IIV4 09/29/2016, 10/10/2017     MMR 03/26/1990, 09/14/2000     Mantoux Tuberculin Skin Test 06/28/2012, 07/03/2012     Meningococcal (Menomune ) 08/10/2006     OPV, trivalent, live 01/26/1989, 05/22/1989, 07/27/1989, 11/11/1993     TD (ADULT, 7+) 09/14/2000     TDAP Vaccine (Adacel) 06/28/2012     Td (Adult), Adsorbed 09/14/2000     Tdap (Adacel,Boostrix) 06/28/2012     Reviewed Immunization Record Today    EXAMINATION:  /81 (BP Location: Left arm, Patient Position: Sitting, Cuff Size: Adult Large)   Pulse 83   Temp 98.7  F (37.1  C) (Oral)   Resp 18   Ht 1.695 m (5' 6.75\")   Wt 95.3 kg (210 lb)   SpO2 96%   BMI 33.14 kg/m    GENERAL: healthy, alert and no distress  EYES: Eyes grossly normal to inspection, extraocular movements - intact, and PERRL  HENT: ear canals- normal; TMs- normal; Nose- normal; Mouth- no ulcers, no lesions  NECK: no tenderness, no adenopathy, no asymmetry, no masses, no stiffness  RESP: lungs clear to auscultation - no rales, no rhonchi, no wheezes  CV: regular rates and rhythm, normal S1 S2, no S3 or S4 and no murmur, no click or rub -  ABDOMEN: soft, no tenderness, no  hepatosplenomegaly, no masses, normal bowel sounds  MS: extremities- no gross deformities noted, no edema  SKIN: no suspicious lesions, no rashes  NEURO: strength and tone- normal, " sensory exam- grossly normal, mentation- intact, speech- normal, reflexes- symmetric  BACK: no paralumbar tenderness  PSYCH: Alert and oriented times 3; speech- coherent , normal rate and volume;  no tangential thoughts, no hallucinations or delusions, developmental delay present   LYMPHATICS: ant. cervical- normal, post. cervical- normal, supraclavicular- normal      Assessment and Plan:   Devan was seen today for recheck.    Diagnoses and all orders for this visit:    Encounter for medication monitoring: Psychiatry wanted lipid panel  -     Lipid Panel ([facility])    Routine general medical examination at a health care facility  -     Comprehensive Metabolic Panel ([facility])  -     CBC with Diff Plt ([facility])  -     Lipid Panel ([facility])  -     Hemoglobin A1c ([facility])    Screening for lipid disorders  -     Lipid Panel ([facility])    Class 1 obesity due to excess calories without serious comorbidity with body mass index (BMI) of 33.0 to 33.9 in adult  - Discussed weight gain. Encouraged patient and care worker to avoid sodas, eat more fruits and veggies for snacks and walk daily    Lab results were also communicated to patient via mail.    I discussed the patient with  who is in agreement with the assessment and plan.     Yu Gomez DO  Family Medicine Resident

## 2019-04-16 ASSESSMENT — ANXIETY QUESTIONNAIRES: GAD7 TOTAL SCORE: 7

## 2019-04-16 NOTE — RESULT ENCOUNTER NOTE
Please send the group home a copy of the results. Please let them know:    Hello! Devan's liver enzymes are elevated this year. This could mean multiple things and needs further workup. Please schedule a follow up appointment to discuss, get further labs, and get an ultrasound to evaluate the liver. His lipids remain elevated are generally unchanged. Please ensure the psychiatrist gets a copy. Patient has no concerns for diabetes and his blood cells are normal. Please call if you have any questions.     Thank you,   Yu Gomez

## 2019-05-09 ENCOUNTER — OFFICE VISIT (OUTPATIENT)
Dept: FAMILY MEDICINE | Facility: CLINIC | Age: 29
End: 2019-05-09
Payer: COMMERCIAL

## 2019-05-09 VITALS
HEART RATE: 105 BPM | SYSTOLIC BLOOD PRESSURE: 132 MMHG | TEMPERATURE: 98.5 F | WEIGHT: 221.6 LBS | BODY MASS INDEX: 34.97 KG/M2 | DIASTOLIC BLOOD PRESSURE: 84 MMHG

## 2019-05-09 DIAGNOSIS — R74.8 ELEVATED LIVER ENZYMES: Primary | ICD-10-CM

## 2019-05-09 NOTE — PATIENT INSTRUCTIONS
Patient Education     Losing Weight for Heart Health  Excess weight is a major risk factor for heart disease. Losing weight has many benefits including lowering your blood pressure, improving your cholesterol level, and decreasing your risk for diseases such as diabetes and heart disease. It may help keep your arteries open so that your heart can get the oxygen-rich blood it needs. All in all, losing weight makes you healthier.     Exercise with a friend. When activity is fun, you're more likely to stick with it.   Calories and weight loss    Calories are the fuel your body burns for energy. You get the calories you need from the food you eat. For healthy weight loss, women should eat at least 1,200 calories a day, men at least 1,500.    When you eat more calories than you need, your body stores the extra calories as fat. One pound of fat equals 3,500 calories.    To lose weight, try to reduce your total calorie intake by 500 calories. To do this, eat 250 calories less each day. Add activity to burn the other 250 calories. Walking 2.5 miles burns about 250 calories. Other more intense activities can burn more calories in the time you spend doing them, such as swimming and running. It is important to understand that reducing calorie intake is much more effective at weight loss than is exercise.    Eat a variety of healthy foods to get the nutrients you need.  Tips for losing weight    Drink 8 to 10 glasses of water a day.    Don t skip meals. Instead, eat smaller portions.    Eat your meals earlier in the day.    Cut out sugary drinks such as soda and fruit juices.    Make your later meals lighter than your earlier meals.   Brisk activity is best  Brisk activity gets your heart pumping faster and it makes it healthier. It s also a great way to burn calories. In fact, your body may keep burning calories for hours after you stop a brisk activity:    Begin by walking 10 minutes most days.    Add more time and speed to  your walk. Build up as you feel able.    Aim for 3 to 4 sessions of aerobic exercise a week. Each session should last about 40 minutes and include moderate to vigorous physical activity.    The most important part of the activity is that you break a sweat. This indicates your heart is working hard enough to burn fat.  Date Last Reviewed: 6/1/2016 2000-2018 [employer]). [address] [place], [place] [id number]. All rights reserved. This information is not intended as a substitute for professional medical care. Always follow your healthcare professional's instructions.

## 2019-05-09 NOTE — PROGRESS NOTES
SUBJECTIVE       Devan Dunn is a 28 year old  male with a PMH significant for:     Patient Active Problem List   Diagnosis     Health Care Home     Active autistic disorder     Schizophreniform disorder (H)     Depression with anxiety     Autism disorder     Schizoaffective disorder (H)     Schizophrenia (H)     He presents with his  for follow-up of labs.    Patient was seen for his annual checkup a few weeks ago.  CMP and lipids were checked at that time due to patient being on antipsychotics.  Patient's lipids have elevated slightly but are overall unchanged.  Patient's AST and ALT have jumped dramatically.  Patient lives in a group home and is low risk for hepatitis exposure.  He has had increase in weight every single visit for the last few years.  This was discussed at his well visit and they had set a goal to stop drinking pop and exercise more often.  Patient currently is not drinking any pop.  Since last visit patient has put on 10 pounds.  Discussed that we should screen for hepatitis infection and get a abdominal ultrasound.  This is likely due to fatty liver disease.  Discussed more extreme lifestyle modification.  Patient gets 2 meals through his group home which are cooked in house.  These nails are relatively well controlled.  He gets 1 meal of his choosing out.  This could be groceries or on occasion out to eat.  Discussed limiting portions.  Patient seems on board with making a diet change.  Also encouraged plenty of walking and tennis.  Patient enjoys tennis and has a court across the street.    PMH, Medications and Allergies were reviewed and updated as needed.        REVIEW OF SYSTEMS     CONSTITUTIONAL: NEGATIVE for fever, chills, POS for increased weight  ENT/MOUTH: NEGATIVE for ear, mouth and throat problems  RESP: NEGATIVE for significant cough or SOB  CV: NEGATIVE for chest pain, palpitations or peripheral edema  GI: NEGATIVE for nausea, abdominal pain, heartburn,  or change in bowel habits  MUSCULOSKELETAL: NEGATIVE for significant arthralgias or myalgia  PSYCHIATRIC: NEGATIVE for changes in mood or affect        OBJECTIVE     Vitals:    05/09/19 1605   BP: 132/84   Pulse: 105   Temp: 98.5  F (36.9  C)   Weight: 100.5 kg (221 lb 9.6 oz)     Body mass index is 34.97 kg/m .    Constitutional: Awake, alert, cooperative, no apparent distress, and appears stated age.  Eyes: Lids and lashes normal, sclera clear, conjunctiva normal.  ENT: Normocephalic, without obvious abnormality, atramatic,   Lungs: No increased work of breathing, good air exchange, clear to auscultation bilaterally, no crackles or wheezing.  Cardiovascular: Regular rate and rhythm, normal S1 and S2, no S3 or S4, and no murmur noted.  Abdomen: No scars, normal bowel sounds, soft, non-distended, non-tender, no masses palpated, no hepatosplenomegally.  Musculoskeletal: No redness, warmth, or swelling of the joints.  Full range of motion noted.   Neurologic: Awake, alert, oriented to name, place and time.  Cranial nerves II-XII are grossly intact and gait is normal.    No results found for this or any previous visit (from the past 24 hour(s)).      ASSESSMENT AND PLAN     Devan was seen today for blood draw.    Diagnoses and all orders for this visit:    Elevated liver enzymes: History and physical most consistent with fatty liver disease.  We will collect hepatitis studies and get an abdominal ultrasound to confirm.  Discussed lifestyle modification that includes limiting portions and more walking.  Group home and patient seem to be in agreement.  Will have pharmacy team evaluate medications for any potential causes.  If so we will contact psychiatrist who is currently prescribing medications.  -     Hepatitis B Core Ab ([facility])  -     Hepatitis B Surface Ab ([facility])  -     Hepatitis B Surface Ag ([facility])  -     Hepatitis C Antibody ([facility])  -     US ABDOMEN LIMITED; Future    RTC based on labs and  imaging or sooner if develops new or worsening symptoms.  I discussed the patient with  who is in agreement with the assessment and plan.   Yu Gomez

## 2019-05-09 NOTE — LETTER
May 13, 2019      Devan Dunn  [address] [place] [id number]        Dear Devan Hartman's hepatitis labs came back normal. You do not have an active liver infection and has been immunized to Hepatitis B. Please schedule for the abdominal ultra sound.     Please see below for your test results.    Resulted Orders   Hepatitis B Core Ab ([facility])   Result Value Ref Range    Hepatitis B Core Antibody Negative Negative    Narrative    Test performed by:  [facility] [address] [place] [address]   [facility]   Result Value Ref Range    Hepatitis B Surface Antibody Positive (A) Negative    Narrative    Test performed by:  [facility] [address] [place] [address]   Hepatitis B Surface Ag ([facility])   Result Value Ref Range    Hepatitis B Surface Antigen Negative Negative    Narrative    Test performed by:  [facility] [address] [place] [address]   Hepatitis C Antibody ([facility])   Result Value Ref Range    Hepatitis C Antibody Screen Negative Negative    Narrative    Test performed by:  [facility] [address] [place] [address]       If you have any questions, please call the clinic to make an appointment.    Sincerely,    Yu Gomez MD

## 2019-05-09 NOTE — PROGRESS NOTES
Preceptor attestation:  Vital signs reviewed: /84   Pulse 105   Temp 98.5  F (36.9  C)   Wt 100.5 kg (221 lb 9.6 oz)   BMI 34.97 kg/m      Patient seen, evaluated, and discussed with the resident.  I have verified the content of the note, which accurately reflects my assessment of the patient and the plan of care.    Supervising physician: Deborah Canales MD  [facility]

## 2019-05-10 LAB
HBV CORE AB SERPL QL IA: NEGATIVE
HBV SURFACE AB SER-ACNC: POSITIVE M[IU]/ML
HBV SURFACE AG SERPL QL IA: NEGATIVE
HCV AB SER QL: NEGATIVE

## 2019-05-10 NOTE — RESULT ENCOUNTER NOTE
Devan's hepatitis labs came back normal. He does not have an active liver infection and has been immunized to Hepatitis B. Please have him schedule for the abdominal ultra sound.     Thank you,  Dr. Yu Gomez

## 2019-05-17 ENCOUNTER — TELEPHONE (OUTPATIENT)
Dept: FAMILY MEDICINE | Facility: CLINIC | Age: 29
End: 2019-05-17

## 2019-05-17 NOTE — TELEPHONE ENCOUNTER
Called [facility] to notify them of elevated liver enzymes. In addition, notified them of pharm D's thoughts after reviewing medication:       If the increased liver enzymes are drug-induced, the most likely culprit is risperidone (30% incidence).  Info on the other psych meds:  Doxepin (16% incidence)  Venlafaxine (< 1% incidence)  Clonazepam: rare  Trihexyphenidyl: none    Yu Gomez

## 2019-05-22 PROBLEM — R74.8 ELEVATED LIVER ENZYMES: Status: ACTIVE | Noted: 2019-05-22

## 2019-05-22 PROBLEM — K76.0 HEPATIC STEATOSIS: Status: ACTIVE | Noted: 2019-05-22

## 2019-05-22 PROBLEM — E78.5 ELEVATED LIPIDS: Status: ACTIVE | Noted: 2019-05-22

## 2019-05-24 NOTE — TELEPHONE ENCOUNTER
Gloria with [facility]  with Dr.Vicky Joyner they never received the lab order for the elevation liver enzymes that might be caused from the  Risperidone fax # [phone] phone # [phone] can you please fax these over again.

## 2019-05-24 NOTE — TELEPHONE ENCOUNTER
Lab results from 4/15/19 and 5/9/19 faxed to Gloria at [facility] [phone].    DERIAN Kirk RN

## 2019-08-19 ENCOUNTER — DOCUMENTATION ONLY (OUTPATIENT)
Dept: FAMILY MEDICINE | Facility: CLINIC | Age: 29
End: 2019-08-19

## 2019-08-19 NOTE — PROGRESS NOTES
To be completed in Nursing note:    Please reference list for forms that require a visit for completion.  Please remind patients that providers are given 3-5 business days to complete and return forms.      Form type: [facility] Medical Information History and Orders    Date form received: 19    Date form completed by Physician: 19    How was form returned to patient (mailed, faxed, or at  for patient to ): Faxed to [phone] and [phone]    Date form mailed/faxed/left at  for patient and sent to HIM for scannin/21/19  Once form is left for patient, faxed, or mailed PCS will then close the documentation only encounter.

## 2019-10-08 ENCOUNTER — DOCUMENTATION ONLY (OUTPATIENT)
Dept: FAMILY MEDICINE | Facility: CLINIC | Age: 29
End: 2019-10-08

## 2019-10-08 NOTE — PROGRESS NOTES
To be completed in Nursing note:    Please reference list for forms that require a visit for completion.  Please remind patients that providers are given 3-5 business days to complete and return forms.      Form type: Physician's order from [facility]    Date form received: 10/8/19    Date form completed by Physician: 10/10/19    How was form returned to patient (mailed, faxed, or at  for patient to ): fax [phone]    Date form mailed/faxed/left at  for patient and sent to HIM for scanning:  10/10/19    Once form is left for patient, faxed, or mailed PCS will then close the documentation only encounter.

## 2019-10-10 ENCOUNTER — DOCUMENTATION ONLY (OUTPATIENT)
Dept: FAMILY MEDICINE | Facility: CLINIC | Age: 29
End: 2019-10-10

## 2019-10-10 ENCOUNTER — MEDICAL CORRESPONDENCE (OUTPATIENT)
Dept: HEALTH INFORMATION MANAGEMENT | Facility: CLINIC | Age: 29
End: 2019-10-10

## 2019-10-10 NOTE — PROGRESS NOTES
To be completed in Nursing note:    Please reference list for forms that require a visit for completion.  Please remind patients that providers are given 3-5 business days to complete and return forms.      Form type: Medical Information History and Orders From [facility]    Date form received: 10/8/19    Date form completed by Physician: 10/10/19    How was form returned to patient (mailed, faxed, or at  for patient to ): fax to [phone]    Date form mailed/faxed/left at  for patient and sent to HIM for scanning:      Once form is left for patient, faxed, or mailed PCS will then close the documentation only encounter.

## 2019-10-31 DIAGNOSIS — Z79.899 HIGH RISK MEDICATION USE: ICD-10-CM

## 2019-10-31 DIAGNOSIS — Z79.899 HIGH RISK MEDICATION USE: Primary | ICD-10-CM

## 2019-10-31 LAB
CHOLEST SERPL-MCNC: 224 MG/DL
FASTING?: YES
FASTING?: YES
GLUCOSE SERPL-MCNC: 80 MG/DL (ref 70–125)
HDLC SERPL-MCNC: 37 MG/DL
LDLC SERPL CALC-MCNC: 133 MG/DL
PROLACTIN SERPL-MCNC: 51.7 NG/ML (ref 0–15)
TRIGL SERPL-MCNC: 269 MG/DL

## 2019-11-01 LAB — HBA1C MFR BLD: 5.5 % (ref 4.2–6.1)

## 2019-11-07 ENCOUNTER — DOCUMENTATION ONLY (OUTPATIENT)
Dept: FAMILY MEDICINE | Facility: CLINIC | Age: 29
End: 2019-11-07

## 2019-11-07 NOTE — PROGRESS NOTES
From:  [address]  [address]  [phone]  Fax [phone]      To: [facility]    Attn: Myles George MD    Fax Number:[phone]    Date: 11/7/2019    RE: Devan Dunn 1990 MRN [id number]      RESULTS FOR YOUR REVIEW:    Orders Only on 10/31/2019   Component Date Value Ref Range Status     Prolactin 10/31/2019 51.7* 0.0 - 15.0 ng/mL Final     Cholesterol 10/31/2019 224* <=199 mg/dL Final     Triglycerides 10/31/2019 269* <=149 mg/dL Final     HDL Cholesterol 10/31/2019 37* >=40 mg/dL Final     LDL Cholesterol Calculated 10/31/2019 133* <=129 mg/dL Final     Fasting? 10/31/2019 Yes   Final     Glucose 10/31/2019 80  70 - 125 mg/dL Final     Fasting? 10/31/2019 Yes   Final     Hemoglobin A1C 10/31/2019 5.5  4.2 - 6.1 % Final       COMMENTS:         Thanks,  Yu Gomez    Results faxed by Prosper Healy [facility]

## 2020-02-13 ENCOUNTER — TRANSFERRED RECORDS (OUTPATIENT)
Dept: HEALTH INFORMATION MANAGEMENT | Facility: CLINIC | Age: 30
End: 2020-02-13

## 2020-02-13 LAB — PHQ9 SCORE: 5

## 2020-04-03 DIAGNOSIS — K21.9 GASTROESOPHAGEAL REFLUX DISEASE, ESOPHAGITIS PRESENCE NOT SPECIFIED: Primary | ICD-10-CM

## 2020-04-03 RX ORDER — FAMOTIDINE 40 MG/1
40 TABLET, FILM COATED ORAL 2 TIMES DAILY
Qty: 180 TABLET | Refills: 3 | Status: SHIPPED | OUTPATIENT
Start: 2020-04-03 | End: 2020-04-10 | Stop reason: ALTCHOICE

## 2020-04-03 NOTE — TELEPHONE ENCOUNTER
Alternative RX sent due to FDA recall    Yu Gomez    BPH (benign prostatic hyperplasia)    Coronary artery disease of native artery of native heart with stable angina pectoris    HLD (hyperlipidemia)    Hypertension    Pacemaker  [employer]

## 2020-04-03 NOTE — TELEPHONE ENCOUNTER
Medication Change Request for ranitidine (ZANTAC) 150 MG capsule. Please advise. Andreina Healy CMA

## 2020-04-09 ENCOUNTER — TELEPHONE (OUTPATIENT)
Dept: FAMILY MEDICINE | Facility: CLINIC | Age: 30
End: 2020-04-09

## 2020-04-09 DIAGNOSIS — K21.9 GASTROESOPHAGEAL REFLUX DISEASE, ESOPHAGITIS PRESENCE NOT SPECIFIED: ICD-10-CM

## 2020-04-09 NOTE — TELEPHONE ENCOUNTER
Drug change for famotidine (PEPCID) 40 MG tablet. Please see message and advise.    Message: Famotidine is on back order, please send in alternative or try another pharmacy.    Andreina Healy CMA

## 2020-04-10 RX ORDER — NIZATIDINE 150 MG/1
150 CAPSULE ORAL 2 TIMES DAILY
Qty: 60 CAPSULE | Refills: 3 | Status: SHIPPED | OUTPATIENT
Start: 2020-04-10 | End: 2020-07-08

## 2020-04-13 ENCOUNTER — TELEPHONE (OUTPATIENT)
Dept: FAMILY MEDICINE | Facility: CLINIC | Age: 30
End: 2020-04-13

## 2020-04-13 NOTE — TELEPHONE ENCOUNTER
Per pharmacy nizatidine (AXID) 150 MG capsule is not available, neither is ranitidine or famotidine. Please change.    Please advise and send to script.     TO Omer

## 2020-04-14 NOTE — TELEPHONE ENCOUNTER
Discussed with pharmacy. No other alternatives available. Patient would need Tums or PPI. Per group home patient is not currently having any problems with GERD. Will continue to monitor. Should he develop some will call the clinic for PPI prescription. Otherwise will use TUMs or Maalox as needed.     Yu Gomez

## 2020-06-02 ENCOUNTER — TRANSFERRED RECORDS (OUTPATIENT)
Dept: HEALTH INFORMATION MANAGEMENT | Facility: CLINIC | Age: 30
End: 2020-06-02

## 2020-07-08 ENCOUNTER — OFFICE VISIT (OUTPATIENT)
Dept: FAMILY MEDICINE | Facility: CLINIC | Age: 30
End: 2020-07-08
Payer: COMMERCIAL

## 2020-07-08 VITALS
HEART RATE: 85 BPM | TEMPERATURE: 98.3 F | SYSTOLIC BLOOD PRESSURE: 136 MMHG | HEIGHT: 67 IN | OXYGEN SATURATION: 93 % | RESPIRATION RATE: 24 BRPM | BODY MASS INDEX: 34.75 KG/M2 | DIASTOLIC BLOOD PRESSURE: 93 MMHG | WEIGHT: 221.4 LBS

## 2020-07-08 DIAGNOSIS — F20.89 OTHER SCHIZOPHRENIA (H): ICD-10-CM

## 2020-07-08 DIAGNOSIS — Z00.00 ROUTINE GENERAL MEDICAL EXAMINATION AT A HEALTH CARE FACILITY: Primary | ICD-10-CM

## 2020-07-08 DIAGNOSIS — K21.9 GASTROESOPHAGEAL REFLUX DISEASE, ESOPHAGITIS PRESENCE NOT SPECIFIED: ICD-10-CM

## 2020-07-08 DIAGNOSIS — K76.0 HEPATIC STEATOSIS: ICD-10-CM

## 2020-07-08 LAB
ALBUMIN SERPL-MCNC: 5 MG/DL (ref 3.9–5.1)
ALP SERPL-CCNC: 84 U/L (ref 40–150)
ALT SERPL-CCNC: 289.7 U/L (ref 0–45)
AST SERPL-CCNC: 182.2 U/L (ref 0–55)
BILIRUB SERPL-MCNC: 1.1 MG/DL (ref 0.2–1.3)
BUN SERPL-MCNC: 11.6 MG/DL (ref 7–21)
CALCIUM SERPL-MCNC: 10.3 MG/DL (ref 8.5–10.1)
CHLORIDE SERPLBLD-SCNC: 98.9 MMOL/L (ref 98–110)
CHOLEST SERPL-MCNC: 217.4 MG/DL (ref 0–200)
CHOLEST/HDLC SERPL: 4.9 {RATIO} (ref 0–5)
CO2 SERPL-SCNC: 28.1 MMOL/L (ref 20–32)
CREAT SERPL-MCNC: 1 MG/DL (ref 0.7–1.3)
GFR SERPL CREATININE-BSD FRML MDRD: >90 ML/MIN/1.7 M2
GLUCOSE SERPL-MCNC: 95.8 MG'DL (ref 70–99)
HBA1C MFR BLD: 5.4 % (ref 4.1–5.7)
HDLC SERPL-MCNC: 44.8 MG/DL
LDLC SERPL CALC-MCNC: 140 MG/DL (ref 0–129)
POTASSIUM SERPL-SCNC: 4.4 MMOL/L (ref 3.2–4.6)
PROT SERPL-MCNC: 8.2 G/DL (ref 6.8–8.8)
SODIUM SERPL-SCNC: 134.1 MMOL/L (ref 132–142)
TRIGL SERPL-MCNC: 162.4 MG/DL (ref 0–150)
VLDL CHOLESTEROL: 32.5 MG/DL (ref 7–32)

## 2020-07-08 RX ORDER — OMEPRAZOLE 10 MG/1
20 CAPSULE, DELAYED RELEASE ORAL DAILY
Qty: 90 CAPSULE | Refills: 3 | Status: SHIPPED | OUTPATIENT
Start: 2020-07-08 | End: 2020-11-30

## 2020-07-08 ASSESSMENT — MIFFLIN-ST. JEOR: SCORE: 1917.89

## 2020-07-08 NOTE — PROGRESS NOTES
Male Physical Note      Concerns today: GERD med- famotidine- got recalled, had a hard time getting other H2 blockers from the pharmacy.    Had been working on weight loss but limited exercise options beyond walking around the block due to closure of day programs/rec centers    Living in group home, following with psychiatry. Doing well on meds, no concerns about behaviors per group home staff. Hasn't noticed any increased breast growth.     ROS:                      CONSTITUTIONAL: no fatigue, no unexpected change in weight  SKIN: no worrisome rashes, no worrisome moles, no worrisome lesions  EYES: no acute vision problems or changes  ENT: no ear problems, no mouth problems, no throat problems  RESP: no significant cough, no shortness of breath  CV: no chest pain, no palpitations, no new or worsening peripheral edema  GI: no nausea, no vomiting, no constipation, no diarrhea    Past Medical History:   Diagnosis Date     Autism disorder      Depressive disorder      Schizophrenia (H)         Family History   Problem Relation Age of Onset     Diabetes Brother      Coronary Artery Disease No family hx of      Cancer No family hx of      Reviewed no other significant FH           Family History and past Medical History reviewed and unchanged/updated.    Social History     Tobacco Use     Smoking status: Never Smoker     Smokeless tobacco: Never Used   Substance Use Topics     Alcohol use: No     Single  Children ? no      RISK BEHAVIORS AND HEALTHY HABITS:  Tobacco Use/Smoking: None  Illicit Drug Use: None  ETOH: None  Do you use alcohol? No  Sexually Active: No  Diet (5-7 servings of fruits/veg daily): Yes   Exercise (30 min accumulated most days):Yes  Dental Care: Yes   Calcium 1500 mg/d:  Yes  Seat Belt Use: Yes     HIV screening:  Recommended and patient accepted testing.      Immunization History   Administered Date(s) Administered     DTAP (<7y) 01/26/1989, 05/22/1989, 07/27/1989, 03/26/1990, 11/11/1993     FLU  "6-35 months 11/11/2015     HEPA 06/28/2012, 08/10/2012     Hep B, Peds or Adolescent 12/05/2002     HepA-Peds, Unspecified 06/28/2012     HepA-ped 2 Dose 08/10/2006     HepB 09/14/2000, 07/06/2001, 10/30/2001, 12/05/2002     HepB, Unspecified 09/14/2000, 07/06/2001, 10/30/2001     Hib (PRP-T) 01/26/1989, 05/22/1989, 07/27/1989, 03/26/1990, 11/02/1990     Hib, Unspecified 01/26/1989, 05/22/1989, 07/27/1989, 03/26/1990, 11/02/1990     Historical DTP/aP 01/26/1989, 05/22/1989, 07/27/1989, 03/26/1990, 11/11/1993     Influenza (IIV3) PF 01/16/2012, 09/30/2013     Influenza Vaccine IM > 6 months Valent IIV4 09/29/2016, 10/10/2017     MMR 03/26/1990, 09/14/2000     Mantoux Tuberculin Skin Test 06/28/2012, 07/03/2012     Meningococcal (Menomune ) 08/10/2006     OPV, trivalent, live 01/26/1989, 05/22/1989, 07/27/1989, 11/11/1993     TD (ADULT, 7+) 09/14/2000     TDAP Vaccine (Adacel) 06/28/2012     Td (Adult), Adsorbed 09/14/2000     Tdap (Adacel,Boostrix) 06/28/2012     Reviewed Immunization Record Today    EXAMINATION:  BP (!) 136/93 (BP Location: Left arm, Patient Position: Sitting, Cuff Size: Adult Large)   Pulse 85   Temp 98.3  F (36.8  C) (Oral)   Resp 24   Ht 1.702 m (5' 7\")   Wt 100.4 kg (221 lb 6.4 oz)   SpO2 93%   BMI 34.68 kg/m    GENERAL: healthy, alert and no distress  EYES: Eyes grossly normal to inspection, extraocular movements - intact, and PERRL  HENT: ear canals- normal; TMs- normal; Nose- normal; Mouth- no ulcers, no lesions  NECK: no tenderness, no adenopathy, no asymmetry, no masses, no stiffness; thyroid- normal to palpation  RESP: lungs clear to auscultation - no rales, no rhonchi, no wheezes  BREAST: Overweight, but no gynecomastia  CV: regular rates and rhythm, normal S1 S2, no S3 or S4 and no murmur, no click or rub -  ABDOMEN: soft, no tenderness, no  hepatosplenomegaly, no masses, normal bowel sounds  MS: extremities- no gross deformities noted, no edema  SKIN: no suspicious lesions, no " rashbryon  NEURO: strength and tone- normal, sensory exam- grossly normal, mentation- intact, speech- normal, reflexes- symmetric  BACK: no CVA tenderness, no paralumbar tenderness  - male: declined  Rectal- male: prostate symmetric w/o nodularity, no masses palpated and deferred  PSYCH: Alert and oriented times 3; speech- coherent , normal rate and volume; able to articulate logical thoughts, able to abstract reason, no tangential thoughts, no hallucinations or delusions, affect- normal    ASSESSMENT:      1. Routine general medical examination at a health care facility  Normal physical exam. Will check lipid panel and HgbA1C given obesity and on psych meds. Elevated prolactin in the past (51), but no gynecomastia and doing well on risperidone so elected to continue without changes. Deferred testing at this time as do not think it would affect management.   One time HIV screening indicated and ordered.   - Hemoglobin A1c ([facility])  - Lipid Panel ([facility])  - HIV Ag/Ab Screen [place] ([facility])    2. Gastroesophageal reflux disease, esophagitis presence not specified  Famotidine recalled, couldn't get other H2 blockers due to pharmacy on backorder. Has been without and symptomatic- substituting omeprazole for now.   - omeprazole (PRILOSEC) 10 MG DR capsule; Take 2 capsules (20 mg) by mouth daily  Dispense: 90 capsule; Refill: 3    3. Other schizophrenia (H)    4. Hepatic steatosis  Counseled on weight loss- has been steady from last year to now. Exercise limited due to COVID closing day programs and [facility] centers for his group home. Counseled on diet in the interim  - could consider metformin for weight loss benefits  - Comprehensive Metabolic Panel ([facility])      Will call patient with results.  I precepted  Today with Dr. Haroon Fernandez MD MD PGY2  [facility]

## 2020-07-08 NOTE — LETTER
July 9, 2020      Devan Dunn  [address] [address]      Dear Devan Dunn       The results from your labs showed that your liver enzymes are still up, but a little bit better than last year. Your ultrasound last year of the liver showed that you had fatty liver disease, so it's important to keep working on losing weight by exercising whenever you can (try to get at least 30 minutes of moving in a day) and eating a healthy diet.  Your cholesterol is still a little high as well, but not high enough that we need to start medication for it. Your AIC is about the same as last year and shows no diabetes. And your HIV test that we screen everyone for was negative, which is good.       We'll see you back in one year for more labs and your next physical, or earlier if you have any concerns!       Thank you for allowing me to be a part of your health care!           Resulted Orders   Hemoglobin A1c ([facility])   Result Value Ref Range    Hemoglobin A1C 5.4 4.1 - 5.7 %   Lipid Panel ([facility])   Result Value Ref Range    Cholesterol 217.4 (H) 0.0 - 200.0 mg/dL    Cholesterol/HDL Ratio 4.9 0.0 - 5.0    HDL Cholesterol 44.8 >40.0 mg/dL    LDL Cholesterol Calculated 140 (H) 0 - 129 mg/dL    Triglycerides 162.4 (H) 0.0 - 150.0 mg/dL    VLDL Cholesterol 32.5 (H) 7.0 - 32.0 mg/dL   Comprehensive Metabolic Panel ([place])   Result Value Ref Range    Albumin 5.0 3.9 - 5.1 mg/dL    Alkaline Phosphatase 84.0 40.0 - 150.0 U/L    .7 (H) 0.0 - 45.0 U/L    .2 (H) 0.0 - 55.0 U/L    Bilirubin Total 1.1 0.2 - 1.3 mg/dL    Urea Nitrogen 11.6 7.0 - 21.0 mg/dL    Calcium 10.3 (H) 8.5 - 10.1 mg/dL    Chloride 98.9 98.0 - 110.0 mmol/L    Carbon Dioxide 28.1 20.0 - 32.0 mmol/L    Creatinine 1.0 0.7 - 1.3 mg/dL    Glucose 95.8 70.0 - 99.0 mg'dL    Potassium 4.4 3.2 - 4.6 mmol/L    Sodium 134.1 132.0 - 142.0 mmol/L    Protein Total 8.2 6.8 - 8.8 g/dL    GFR Estimate >90 >60.0 mL/min/1.7 m2    GFR Estimate If Black  >90 >60.0 mL/min/1.7 m2   HIV Ag/Ab Screen [place] ([place])   Result Value Ref Range    HIV Antigen/Antibody Negative Negative    Narrative    Test performed by:  [facility] [address] [place] [address]  Method is Abbott HIV Ag/Ab for the detection of HIV p24 antigen, HIV-1   antibodies and HIV-2 antibodies.       If you have any questions or concerns, please call the clinic at the number listed above.       Sincerely,        Dorita Fernandez MD

## 2020-07-08 NOTE — PROGRESS NOTES
Preceptor Attestation:   Patient seen and evaluated in person. I discussed the patient with the resident. I have verified the content of the note, which accurately reflects my assessment of the patient and the plan of care.  Supervising Physician:  Paige Patiño MD.

## 2020-07-09 LAB — HIV 1+2 AB+HIV1 P24 AG SERPL QL IA: NEGATIVE

## 2020-07-27 ENCOUNTER — TRANSFERRED RECORDS (OUTPATIENT)
Dept: HEALTH INFORMATION MANAGEMENT | Facility: CLINIC | Age: 30
End: 2020-07-27

## 2020-10-14 ENCOUNTER — MEDICAL CORRESPONDENCE (OUTPATIENT)
Dept: HEALTH INFORMATION MANAGEMENT | Facility: CLINIC | Age: 30
End: 2020-10-14

## 2020-10-27 ENCOUNTER — TRANSFERRED RECORDS (OUTPATIENT)
Dept: HEALTH INFORMATION MANAGEMENT | Facility: CLINIC | Age: 30
End: 2020-10-27

## 2020-11-30 ENCOUNTER — OFFICE VISIT (OUTPATIENT)
Dept: FAMILY MEDICINE | Facility: CLINIC | Age: 30
End: 2020-11-30
Payer: COMMERCIAL

## 2020-11-30 VITALS
DIASTOLIC BLOOD PRESSURE: 88 MMHG | OXYGEN SATURATION: 99 % | BODY MASS INDEX: 33.58 KG/M2 | RESPIRATION RATE: 16 BRPM | SYSTOLIC BLOOD PRESSURE: 129 MMHG | WEIGHT: 214.4 LBS | HEART RATE: 107 BPM | TEMPERATURE: 97.4 F

## 2020-11-30 DIAGNOSIS — K21.00 GASTROESOPHAGEAL REFLUX DISEASE WITH ESOPHAGITIS WITHOUT HEMORRHAGE: ICD-10-CM

## 2020-11-30 DIAGNOSIS — Z79.899 HIGH RISK MEDICATION USE: Primary | ICD-10-CM

## 2020-11-30 LAB — PROLACTIN SERPL-MCNC: 53.1 NG/ML (ref 0–15)

## 2020-11-30 PROCEDURE — 99213 OFFICE O/P EST LOW 20 MIN: CPT | Performed by: STUDENT IN AN ORGANIZED HEALTH CARE EDUCATION/TRAINING PROGRAM

## 2020-11-30 PROCEDURE — 36415 COLL VENOUS BLD VENIPUNCTURE: CPT | Performed by: STUDENT IN AN ORGANIZED HEALTH CARE EDUCATION/TRAINING PROGRAM

## 2020-11-30 RX ORDER — FAMOTIDINE 20 MG/1
20 TABLET, FILM COATED ORAL 2 TIMES DAILY
Qty: 180 TABLET | Refills: 1 | Status: SHIPPED | OUTPATIENT
Start: 2020-11-30 | End: 2021-04-25

## 2020-11-30 NOTE — PATIENT INSTRUCTIONS
- prolactin lab ordered; Tony to go to lab before leaving  - changed omeprazole to famotidine (previous shortage of famotidine). Tony should stop taking omeprazole and start taking famotidine 20 mg BID      Ingris Crawley MD

## 2020-11-30 NOTE — PROGRESS NOTES
[facility] Medicine Clinic Note    Patient: Devan Dunn  : 1990  MRN: [id number]    SUBJECTIVE:     Devan Dunn is a 30 year old male with a PMH significant for:  Patient Active Problem List   Diagnosis     Health Care Home     Active autistic disorder     Schizophreniform disorder (H)     Depression with anxiety     Autism disorder     Schizoaffective disorder (H)     Schizophrenia (H)     Hepatic steatosis     Elevated liver enzymes     Elevated lipids     He presents to clinic today to get a lab test for prolactin given that he is on an antipsychotic medication.     Additionally, he was previously switched to omeprazole for GERD given shortage of famotidine last spring. His symptoms are well-controlled, but he would like to go back to famotidine if that is still available. I think that is reasonable.     He says that his mood is good. He was previously going for walks for exercise but now says it is too cold. He denies headache, heartburn, or diarrhea. Would like to get his lab test and get going.       Past Medical History, Past Surgical History, Medications, Allergies, and Family History were reviewed and updated as needed.    ROS:   CONSTITUTIONAL: See above. No fever or chills. No fatigue, unintentional changes in weight, or change in appetite.  HEENT: See above. No headache or neck pain. No recent changes in vision, eye redness, dry eyes, or eye irritation. No runny nose, nasal congestion, sinus pain, or sore throat.  GASTROINTESTINAL: See above. No nausea, vomiting, reflux, diarrhea, or constipation. No abdominal pain, cramping, or bloating.  PSYCHIATRIC: See above. No changes in mood. No changes in sleep, appetite, energy, concentration. No loss of interest. No hallucinations or delusions.  HEMATOLOGIC: See above. No easy bruising or bleeding.    OBJECTIVE:     Vitals:    20 1119   BP: 129/88   BP Location: Left arm   Patient Position: Sitting   Cuff Size: Adult Large   Pulse: 107    Resp: 16   Temp: 97.4  F (36.3  C)   TempSrc: Oral   SpO2: 99%   Weight: 97.3 kg (214 lb 6.4 oz)     Body mass index is 33.58 kg/m .    Physical Exam:  GENERAL: Awake, alert. Well-nourished, well-developed. No acute distress. Appears comfortable.  SKIN: Warm and dry. No rashes, lesions, erythema, petechiae, purpura, ecchymosis, or jaundice.  ABDOMEN: Non-distended. Soft and non-tender in all quadrants  MUSCULOSKELETAL: No gross deformity. Full ROM in all extremities.  PSYCH: Normal affect, mood, orientation, memory and insight.    LABORATORY:  No results found for this or any previous visit (from the past 24 hour(s)).    ASSESSMENT and PLAN:     1. High risk medication use  Requires prl to be monitored for his antipsychotic use. Ordered and form signed.  - Prolactin ([facility])    2. Gastroesophageal reflux disease with esophagitis without hemorrhage  Previously on 40 mg famotidine BID, which was changed to omeprazole spring 2020 due to  shortage. Discussed w/ PharmD, who thought 40 mg famotidine is likely too high of a dose. Recommended 20 mg BID. Discussed with patient, who was agreeable to switching mack to famotidine and discontinuing omeprazole.  - famotidine (PEPCID) 20 MG tablet; Take 1 tablet (20 mg) by mouth 2 times daily  Dispense: 180 tablet; Refill: 1        Return to clinic as needed  for follow-up of:    Prolactin lab    New or worsening heartburn symptoms    Routine health maintenance      Patient was discussed with attending physician, Dr. Javy Nicholson MD, who agrees with the assessment and plan.    Ingris Crawley, PGY-1  [facility]  11/30/2020

## 2020-11-30 NOTE — LETTER
December 3, 2020      Devan Dunn  [address] [address]        Dear ,    We are writing to inform you of your test results.    I called the group home and provided the result verbally, but supervisor requested a letter with the result to be mailed to the home.     Resulted Orders   Prolactin ([facility])   Result Value Ref Range    Prolactin 53.1 (H) 0.0 - 15.0 ng/mL    Narrative    Test performed by:  [facility]  [address] [place] [address]       If you have any questions or concerns, please call the clinic at the number listed above.       Sincerely,      Javy Nicholson MD

## 2020-11-30 NOTE — PROGRESS NOTES
Preceptor Attestation:    Patient seen and evaluated in person. I discussed the patient with the resident. I have verified the content of the note, which accurately reflects my assessment of the patient and the plan of care.   Supervising Physician:  Javy Nicholson MD.

## 2020-12-04 ENCOUNTER — DOCUMENTATION ONLY (OUTPATIENT)
Dept: FAMILY MEDICINE | Facility: CLINIC | Age: 30
End: 2020-12-04

## 2020-12-04 NOTE — PROGRESS NOTES
From:  [address]  [address]  [phone]  Fax [phone]      To: [facility]    Attn: Myles George MD    Fax Number:[phone]    Date: 12/4/2020    RE: Devan DRISCOLL Shaun 1990 MRN [id number]      RESULTS FOR YOUR REVIEW:    Office Visit on 11/30/2020   Component Date Value Ref Range Status     Prolactin 11/30/2020 53.1* 0.0 - 15.0 ng/mL Final       COMMENTS:         Thanks,  Ingris Crawley    Results faxed by Prosper Healy [facility]

## 2021-01-11 ENCOUNTER — TRANSFERRED RECORDS (OUTPATIENT)
Dept: HEALTH INFORMATION MANAGEMENT | Facility: CLINIC | Age: 31
End: 2021-01-11

## 2021-02-16 PROBLEM — R79.89 ELEVATED PROLACTIN LEVEL: Status: ACTIVE | Noted: 2021-02-16

## 2021-02-16 PROBLEM — F20.0 SCHIZOPHRENIA, PARANOID, CHRONIC (H): Chronic | Status: ACTIVE | Noted: 2021-02-16

## 2021-03-05 PROBLEM — F41.1 GAD (GENERALIZED ANXIETY DISORDER): Status: ACTIVE | Noted: 2021-03-05

## 2021-03-05 PROBLEM — G47.00 INSOMNIA: Status: ACTIVE | Noted: 2021-03-05

## 2021-03-05 RX ORDER — VENLAFAXINE HYDROCHLORIDE 37.5 MG/1
37.5 CAPSULE, EXTENDED RELEASE ORAL DAILY
COMMUNITY
Start: 2020-10-23

## 2021-04-05 ENCOUNTER — TRANSFERRED RECORDS (OUTPATIENT)
Dept: HEALTH INFORMATION MANAGEMENT | Facility: CLINIC | Age: 31
End: 2021-04-05

## 2021-07-05 ENCOUNTER — TRANSFERRED RECORDS (OUTPATIENT)
Dept: HEALTH INFORMATION MANAGEMENT | Facility: CLINIC | Age: 31
End: 2021-07-05

## 2021-08-03 DIAGNOSIS — K76.0 HEPATIC STEATOSIS: ICD-10-CM

## 2021-08-03 DIAGNOSIS — Z79.899 HIGH RISK MEDICATION USE: Primary | ICD-10-CM

## 2021-08-04 ENCOUNTER — OFFICE VISIT (OUTPATIENT)
Dept: FAMILY MEDICINE | Facility: CLINIC | Age: 31
End: 2021-08-04
Payer: COMMERCIAL

## 2021-08-04 VITALS
SYSTOLIC BLOOD PRESSURE: 131 MMHG | DIASTOLIC BLOOD PRESSURE: 88 MMHG | HEIGHT: 67 IN | OXYGEN SATURATION: 97 % | TEMPERATURE: 98.8 F | WEIGHT: 214.2 LBS | RESPIRATION RATE: 20 BRPM | BODY MASS INDEX: 33.62 KG/M2 | HEART RATE: 103 BPM

## 2021-08-04 DIAGNOSIS — K76.0 HEPATIC STEATOSIS: ICD-10-CM

## 2021-08-04 DIAGNOSIS — Z79.899 HIGH RISK MEDICATION USE: ICD-10-CM

## 2021-08-04 DIAGNOSIS — Z00.00 ENCOUNTER FOR MEDICARE ANNUAL WELLNESS EXAM: ICD-10-CM

## 2021-08-04 DIAGNOSIS — Z00.00 ROUTINE GENERAL MEDICAL EXAMINATION AT A HEALTH CARE FACILITY: Primary | ICD-10-CM

## 2021-08-04 LAB
ALBUMIN SERPL-MCNC: 3.8 G/DL (ref 3.4–5)
ALP SERPL-CCNC: 78 U/L (ref 40–150)
ALT SERPL W P-5'-P-CCNC: 75 U/L
ANION GAP SERPL CALCULATED.3IONS-SCNC: 10 MMOL/L (ref 3–14)
AST SERPL W P-5'-P-CCNC: 52 U/L (ref 0–45)
BILIRUB SERPL-MCNC: 0.9 MG/DL (ref 0.2–1.3)
BUN SERPL-MCNC: 8 MG/DL (ref 7–30)
CALCIUM SERPL-MCNC: 9.3 MG/DL (ref 8.5–10.1)
CHLORIDE BLD-SCNC: 103 MMOL/L
CHOLEST SERPL-MCNC: 194 MG/DL
CO2 SERPL-SCNC: 28 MMOL/L (ref 20–32)
CREAT SERPL-MCNC: 1.2 MG/DL
ERYTHROCYTE [DISTWIDTH] IN BLOOD BY AUTOMATED COUNT: 12.8 % (ref 10–15)
FASTING STATUS PATIENT QL REPORTED: ABNORMAL
GFR SERPL CREATININE-BSD FRML MDRD: 80 ML/MIN/1.73M2
GLUCOSE BLD-MCNC: 111 MG/DL (ref 70–99)
HCT VFR BLD AUTO: 43.3 % (ref 40–53)
HDLC SERPL-MCNC: 37 MG/DL
HGB BLD-MCNC: 14.7 G/DL (ref 13.3–17.7)
LDLC SERPL CALC-MCNC: 115 MG/DL
MCH RBC QN AUTO: 29.6 PG (ref 26.5–33)
MCHC RBC AUTO-ENTMCNC: 33.9 G/DL (ref 31.5–36.5)
MCV RBC AUTO: 87 FL (ref 78–100)
PLATELET # BLD AUTO: 222 10E3/UL (ref 150–450)
POTASSIUM BLD-SCNC: 3.9 MMOL/L (ref 3.4–5.3)
PROLACTIN SERPL-MCNC: 55.9 NG/ML (ref 0–15)
PROT SERPL-MCNC: 8 G/DL (ref 6.8–8.8)
RBC # BLD AUTO: 4.97 10E6/UL (ref 4.4–5.9)
SODIUM SERPL-SCNC: 141 MMOL/L (ref 133–144)
TRIGL SERPL-MCNC: 212 MG/DL
WBC # BLD AUTO: 5 10E3/UL (ref 4–11)

## 2021-08-04 PROCEDURE — 80061 LIPID PANEL: CPT | Performed by: STUDENT IN AN ORGANIZED HEALTH CARE EDUCATION/TRAINING PROGRAM

## 2021-08-04 PROCEDURE — 99395 PREV VISIT EST AGE 18-39: CPT | Mod: GC | Performed by: STUDENT IN AN ORGANIZED HEALTH CARE EDUCATION/TRAINING PROGRAM

## 2021-08-04 PROCEDURE — 80053 COMPREHEN METABOLIC PANEL: CPT | Performed by: STUDENT IN AN ORGANIZED HEALTH CARE EDUCATION/TRAINING PROGRAM

## 2021-08-04 PROCEDURE — 84146 ASSAY OF PROLACTIN: CPT | Performed by: STUDENT IN AN ORGANIZED HEALTH CARE EDUCATION/TRAINING PROGRAM

## 2021-08-04 PROCEDURE — 85027 COMPLETE CBC AUTOMATED: CPT | Performed by: STUDENT IN AN ORGANIZED HEALTH CARE EDUCATION/TRAINING PROGRAM

## 2021-08-04 PROCEDURE — 36415 COLL VENOUS BLD VENIPUNCTURE: CPT | Performed by: STUDENT IN AN ORGANIZED HEALTH CARE EDUCATION/TRAINING PROGRAM

## 2021-08-04 ASSESSMENT — MIFFLIN-ST. JEOR: SCORE: 1872.84

## 2021-08-04 ASSESSMENT — PATIENT HEALTH QUESTIONNAIRE - PHQ9: SUM OF ALL RESPONSES TO PHQ QUESTIONS 1-9: 2

## 2021-08-04 NOTE — PATIENT INSTRUCTIONS
Preventive Health Recommendations  Male Ages 26 - 39    Yearly exam:             See your health care provider every year in order to  o   Review health changes.   o   Discuss preventive care.    o   Review your medicines if your doctor has prescribed any.    You should be tested each year for STDs (sexually transmitted diseases), if you re at risk.     After age 35, talk to your provider about cholesterol testing. If you are at risk for heart disease, have your cholesterol tested at least every 5 years.     If you are at risk for diabetes, you should have a diabetes test (fasting glucose).  Shots: Get a flu shot each year. Get a tetanus shot every 10 years.     Nutrition:    Eat at least 5 servings of fruits and vegetables daily.     Eat whole-grain bread, whole-wheat pasta and brown rice instead of white grains and rice.     Get adequate Calcium and Vitamin D.     Lifestyle    Exercise for at least 150 minutes a week (30 minutes a day, 5 days a week). This will help you control your weight and prevent disease.     Limit alcohol to one drink per day.     No smoking.     Wear sunscreen to prevent skin cancer.     See your dentist every six months for an exam and cleaning.     Patient Education   Personalized Prevention Plan  You are due for the preventive services outlined below.  Your care team is available to assist you in scheduling these services.  If you have already completed any of these items, please share that information with your care team to update in your medical record.  Health Maintenance Due   Topic Date Due     Discuss Advance Care Planning  Never done     Preventive Health Recommendations  See your health care provider every year to    Review health changes.     Discuss preventive care.      Review your medicines if your doctor has prescribed any.    Talk with your health care provider about whether you should have a test to screen for prostate cancer (PSA).    Every 3 years, have a diabetes test  (fasting glucose). If you are at risk for diabetes, you should have this test more often.    Every 5 years, have a cholesterol test. Have this test more often if you are at risk for high cholesterol or heart disease.     Every 10 years, have a colonoscopy. Or, have a yearly FIT test (stool test). These exams will check for colon cancer.    Talk to with your health care provider about screening for Abdominal Aortic Aneurysm if you have a family history of AAA or have a history of smoking.    Shots:     Get a flu shot each year.     Get a tetanus shot every 10 years.     Talk to your doctor about your pneumonia vaccines. There are now two you should receive - Pneumovax (PPSV 23) and Prevnar (PCV 13).    Talk to your pharmacist about a shingles vaccine.     Talk to your doctor about the hepatitis B vaccine.    Nutrition:     Eat at least 5 servings of fruits and vegetables each day.     Eat whole-grain bread, whole-wheat pasta and brown rice instead of white grains and rice.     Get adequate Calcium and Vitamin D.     Lifestyle    Exercise for at least 150 minutes a week (30 minutes a day, 5 days a week). This will help you control your weight and prevent disease.     Limit alcohol to one drink per day.     No smoking.     Wear sunscreen to prevent skin cancer.     See your dentist every six months for an exam and cleaning.     See your eye doctor every 1 to 2 years to screen for conditions such as glaucoma, macular degeneration and cataracts.    Personalized Prevention Plan  You are due for the preventive services outlined below.  Your care team is available to assist you in scheduling these services.  If you have already completed any of these items, please share that information with your care team to update in your medical record.  Health Maintenance   Topic Date Due     ADVANCE CARE PLANNING  Never done     INFLUENZA VACCINE (1) 09/01/2021     DTAP/TDAP/TD IMMUNIZATION (5 - Td or Tdap) 06/28/2022     MEDICARE  ANNUAL WELLNESS VISIT  08/04/2022     HEPATITIS C SCREENING  Completed     HIV SCREENING  Completed     PHQ-2  Completed     IPV IMMUNIZATION  Completed     HEPATITIS B IMMUNIZATION  Completed     COVID-19 Vaccine  Completed     Pneumococcal Vaccine: Pediatrics (0 to 5 Years) and At-Risk Patients (6 to 64 Years)  Aged Out     MENINGITIS IMMUNIZATION  Aged Out

## 2021-08-04 NOTE — PROGRESS NOTES
"Preceptor attestation:  Vital signs reviewed: /88   Pulse 103   Temp 98.8  F (37.1  C) (Oral)   Resp 20   Ht 1.69 m (5' 6.54\")   Wt 97.2 kg (214 lb 3.2 oz)   SpO2 97%   BMI 34.02 kg/m      Patient seen, evaluated, and discussed with the resident.  I have verified the content of the note, which accurately reflects my assessment of the patient and the plan of care.    Supervising physician: Deborah Canales MD  [facility]  "

## 2021-08-04 NOTE — LETTER
September 10, 2021      Devan Dunn  [address] [address]        Dear ,    We are writing to inform you of your test results.    Your test results fall within the expected range(s) or remain unchanged from previous results.  Please continue with current treatment plan.    Resulted Orders   CBC with platelets   Result Value Ref Range    WBC Count 5.0 4.0 - 11.0 10e3/uL    RBC Count 4.97 4.40 - 5.90 10e6/uL    Hemoglobin 14.7 13.3 - 17.7 g/dL    Hematocrit 43.3 40.0 - 53.0 %    MCV 87 78 - 100 fL    MCH 29.6 26.5 - 33.0 pg    MCHC 33.9 31.5 - 36.5 g/dL    RDW 12.8 10.0 - 15.0 %    Platelet Count 222 150 - 450 10e3/uL   Lipid Profile   Result Value Ref Range    Cholesterol 194 <=199 mg/dL    Triglycerides 212 (H) <=149 mg/dL    Direct Measure HDL 37 (L) >=40 mg/dL      Comment:      HDL Cholesterol Reference Range:     0-2 years:   No reference ranges established for patients under 2 years old  at [facility] for lipid analytes.    2-8 years:  Greater than 45 mg/dL     18 years and older:   Female: Greater than or equal to 50 mg/dL   Male:   Greater than or equal to 40 mg/dL    LDL Cholesterol Calculated 115 <=129 mg/dL    Patient Fasting > 8hrs? Unknown    Prolactin   Result Value Ref Range    Prolactin 55.9 (H) 0.0 - 15.0 ng/mL   Comprehensive metabolic panel   Result Value Ref Range    Sodium 141 133 - 144 mmol/L    Potassium 3.9 3.4 - 5.3 mmol/L    Chloride 103 mmol/L    Carbon Dioxide (CO2) 28 20 - 32 mmol/L    Anion Gap 10 3 - 14 mmol/L    Urea Nitrogen 8 7 - 30 mg/dL    Creatinine 1.20 mg/dL    Calcium 9.3 8.5 - 10.1 mg/dL    Glucose 111 (H) 70 - 99 mg/dL    Alkaline Phosphatase 78 40 - 150 U/L    AST 52 (H) 0 - 45 U/L    ALT 75 U/L    Protein Total 8.0 6.8 - 8.8 g/dL    Albumin 3.8 3.4 - 5.0 g/dL    Bilirubin Total 0.9 0.2 - 1.3 mg/dL    GFR Estimate 80 >60 mL/min/1.73m2      Comment:      As of July 11, 2021, eGFR is calculated by the CKD-EPI creatinine equation,  without race adjustment. eGFR can be influenced by muscle mass, exercise, and diet. The reported eGFR is an estimation only and is only applicable if the renal function is stable.       If you have any questions or concerns, please call the clinic at the number listed above.       Sincerely,      Randell Haynes MD

## 2021-08-04 NOTE — PROGRESS NOTES
Male Physical Note      Concerns today: No special concerns today.    ROS:                      CONSTITUTIONAL: no fatigue, no unexpected change in weight  SKIN: no worrisome rashes, no worrisome moles, no worrisome lesions  EYES: no acute vision problems or changes  ENT: no ear problems, no mouth problems, no throat problems  RESP: no significant cough, no shortness of breath  CV: no chest pain, no palpitations, no new or worsening peripheral edema  GI: no nausea, no vomiting, no constipation, no diarrhea  : no frequency, no dysuria, no hematuria  MS: no claudication, no myalgias, no joint aches  NEURO: no weakness, no dizziness, no syncope, no headaches  ENDOCRINE: no temperature intolerance, no skin/hair changes  PSYCHIATRIC: NEGATIVE for changes in mood or affect    Past Medical History:   Diagnosis Date     Autism disorder      Depressive disorder      Schizophrenia (H)         Family History   Problem Relation Age of Onset     Diabetes Brother      Coronary Artery Disease No family hx of      Cancer No family hx of      Family History and past Medical History reviewed and unchanged/updated.    Social History     Tobacco Use     Smoking status: Never Smoker     Smokeless tobacco: Never Used   Substance Use Topics     Alcohol use: No     Single  Children? no    Has anyone hurt you physically, for example by pushing, hitting, slapping or kicking you or forcing you to have sex? Denies    RISK BEHAVIORS AND HEALTHY HABITS:  Tobacco Use/Smoking: None  Illicit Drug Use: None  ETOH: None  Do you use alcohol? No  Sexually Active: No  Diet (5-7 servings of fruits/veg daily): Yes   Exercise (30 min accumulated most days):Yes  Dental Care: Yes   Calcium 1500 mg/d:  No  Seat Belt Use: Yes       Immunization History   Administered Date(s) Administered     COVID-19,PF,Moderna 02/04/2021, 03/04/2021     DTAP (<7y) 01/26/1989, 05/22/1989, 07/27/1989, 03/26/1990, 11/11/1993     FLU 6-35 months 11/11/2015     HEPA  "06/28/2012, 08/10/2012     Hep B, Peds or Adolescent 12/05/2002     HepA-Peds, Unspecified 06/28/2012     HepA-ped 2 Dose 08/10/2006     HepB 09/14/2000, 07/06/2001, 10/30/2001, 12/05/2002     HepB, Unspecified 09/14/2000, 07/06/2001, 10/30/2001     Hib (PRP-T) 01/26/1989, 05/22/1989, 07/27/1989, 03/26/1990, 11/02/1990     Hib, Unspecified 01/26/1989, 05/22/1989, 07/27/1989, 03/26/1990, 11/02/1990     Historical DTP/aP 01/26/1989, 05/22/1989, 07/27/1989, 03/26/1990, 11/11/1993     Influenza (IIV3) PF 01/16/2012, 09/30/2013     Influenza Vaccine IM > 6 months Valent IIV4 09/29/2016, 10/10/2017, 09/13/2018     Influenza Vaccine, 6+MO IM (QUADRIVALENT W/PRESERVATIVES) 09/10/2019     MMR 03/26/1990, 09/14/2000     Mantoux Tuberculin Skin Test 06/28/2012, 07/03/2012     Meningococcal (Menomune ) 08/10/2006     OPV, trivalent, live 01/26/1989, 05/22/1989, 07/27/1989, 11/11/1993     TD (ADULT, 7+) 09/14/2000     TDAP Vaccine (Adacel) 06/28/2012     Td (Adult), Adsorbed 09/14/2000     Tdap (Adacel,Boostrix) 06/28/2012       EXAMINATION:  /88   Pulse 103   Temp 98.8  F (37.1  C) (Oral)   Resp 20   Ht 1.69 m (5' 6.54\")   Wt 97.2 kg (214 lb 3.2 oz)   SpO2 97%   BMI 34.02 kg/m    GENERAL: healthy, alert and no distress  EYES: Eyes grossly normal to inspection, extraocular movements - intact, and PERRL  HENT: ear canals- normal; TMs- normal; Nose- normal; Mouth- no ulcers, no lesions, crowded oropharynx  NECK: no tenderness, no adenopathy, no asymmetry   RESP: lungs clear to auscultation   CV: regular rates and rhythm, no murmur  ABDOMEN: soft, no tenderness, no  hepatomegaly  MS: extremities- no gross deformities noted, no edema  SKIN: no suspicious lesions, no rashes  NEURO: strength and tone- normal, sensory exam- grossly normal, mentation- intact, speech- normal, reflexes- diminished throughout  BACK: no paralumbar tenderness  - male: Deferred by patient   PSYCH: Alert and oriented times 3; speech- coherent , " normal rate and volume; no tangential thoughts, no hallucinations or delusions, affect- normal  LYMPHATICS: ant. cervical- normal, post. cervical- normal, supraclavicular- normal      Assessment and Plan:    Encounter for Medicare annual wellness exam  Routine general medical examination at a health care facility  Normal exam. No concerns.     High risk medication use  - CBC with platelets  - Prolactin    Hepatic steatosis  LFTs normalized. Recheck in 12 months for ongoing monitoring.   - Lipid Profile  - Comprehensive metabolic panel          Staffed with Dr. Canales,    Ingris Crawley MD  PGY-2  [facility]  Pager: [phone]  Clinic: [phone]  Fax: [phone]

## 2021-10-20 ENCOUNTER — TRANSFERRED RECORDS (OUTPATIENT)
Dept: HEALTH INFORMATION MANAGEMENT | Facility: CLINIC | Age: 31
End: 2021-10-20
Payer: COMMERCIAL

## 2021-10-26 ENCOUNTER — LAB (OUTPATIENT)
Dept: LAB | Facility: CLINIC | Age: 31
End: 2021-10-26
Payer: COMMERCIAL

## 2021-10-26 DIAGNOSIS — Z79.899 HIGH RISK MEDICATION USE: Primary | ICD-10-CM

## 2021-10-26 LAB
BASOPHILS # BLD AUTO: 0 10E3/UL (ref 0–0.2)
BASOPHILS NFR BLD AUTO: 1 %
EOSINOPHIL # BLD AUTO: 0.1 10E3/UL (ref 0–0.7)
EOSINOPHIL NFR BLD AUTO: 2 %
ERYTHROCYTE [DISTWIDTH] IN BLOOD BY AUTOMATED COUNT: 13.2 % (ref 10–15)
HCT VFR BLD AUTO: 43.9 % (ref 40–53)
HGB BLD-MCNC: 14.5 G/DL (ref 13.3–17.7)
IMM GRANULOCYTES # BLD: 0 10E3/UL
IMM GRANULOCYTES NFR BLD: 0 %
LYMPHOCYTES # BLD AUTO: 1.3 10E3/UL (ref 0.8–5.3)
LYMPHOCYTES NFR BLD AUTO: 23 %
MCH RBC QN AUTO: 30.1 PG (ref 26.5–33)
MCHC RBC AUTO-ENTMCNC: 33 G/DL (ref 31.5–36.5)
MCV RBC AUTO: 91 FL (ref 78–100)
MONOCYTES # BLD AUTO: 0.6 10E3/UL (ref 0–1.3)
MONOCYTES NFR BLD AUTO: 11 %
NEUTROPHILS # BLD AUTO: 3.6 10E3/UL (ref 1.6–8.3)
NEUTROPHILS NFR BLD AUTO: 63 %
NRBC # BLD AUTO: 0 10E3/UL
NRBC BLD AUTO-RTO: 0 /100
PLATELET # BLD AUTO: 220 10E3/UL (ref 150–450)
PROLACTIN SERPL-MCNC: 58.9 NG/ML (ref 0–15)
RBC # BLD AUTO: 4.82 10E6/UL (ref 4.4–5.9)
WBC # BLD AUTO: 5.6 10E3/UL (ref 4–11)

## 2021-10-26 PROCEDURE — 85025 COMPLETE CBC W/AUTO DIFF WBC: CPT

## 2021-10-26 PROCEDURE — 36415 COLL VENOUS BLD VENIPUNCTURE: CPT

## 2021-10-26 PROCEDURE — 84146 ASSAY OF PROLACTIN: CPT

## 2021-10-26 NOTE — LETTER
November 2, 2021        Increased prolactin level is stable.   TANNA Monzon       Resulted Orders   CBC with platelets and differential   Result Value Ref Range    WBC Count 5.6 4.0 - 11.0 10e3/uL    RBC Count 4.82 4.40 - 5.90 10e6/uL    Hemoglobin 14.5 13.3 - 17.7 g/dL    Hematocrit 43.9 40.0 - 53.0 %    MCV 91 78 - 100 fL    MCH 30.1 26.5 - 33.0 pg    MCHC 33.0 31.5 - 36.5 g/dL    RDW 13.2 10.0 - 15.0 %    Platelet Count 220 150 - 450 10e3/uL    % Neutrophils 63 %    % Lymphocytes 23 %    % Monocytes 11 %    % Eosinophils 2 %    % Basophils 1 %    % Immature Granulocytes 0 %    NRBCs per 100 WBC 0 <1 /100    Absolute Neutrophils 3.6 1.6 - 8.3 10e3/uL    Absolute Lymphocytes 1.3 0.8 - 5.3 10e3/uL    Absolute Monocytes 0.6 0.0 - 1.3 10e3/uL    Absolute Eosinophils 0.1 0.0 - 0.7 10e3/uL    Absolute Basophils 0.0 0.0 - 0.2 10e3/uL    Absolute Immature Granulocytes 0.0 <=0.0 10e3/uL    Absolute NRBCs 0.0 10e3/uL       If you have any questions or concerns, please call the clinic at the number listed above.       Sincerely,      Juve Monzon MD

## 2021-11-02 NOTE — RESULT ENCOUNTER NOTE
[place]  Please fax CBC and prolactin results to [facility] in [place].  Increased prolactin level is stable.  TANNA Monzon

## 2021-11-18 LAB — PHQ9 SCORE: 12

## 2021-12-01 ENCOUNTER — TRANSFERRED RECORDS (OUTPATIENT)
Dept: HEALTH INFORMATION MANAGEMENT | Facility: CLINIC | Age: 31
End: 2021-12-01
Payer: COMMERCIAL

## 2022-04-04 ENCOUNTER — TRANSFERRED RECORDS (OUTPATIENT)
Dept: HEALTH INFORMATION MANAGEMENT | Facility: CLINIC | Age: 32
End: 2022-04-04
Payer: COMMERCIAL

## 2022-05-23 ENCOUNTER — LAB (OUTPATIENT)
Dept: FAMILY MEDICINE | Facility: CLINIC | Age: 32
End: 2022-05-23
Payer: COMMERCIAL

## 2022-05-23 DIAGNOSIS — Z20.822 SUSPECTED COVID-19 VIRUS INFECTION: ICD-10-CM

## 2022-05-23 PROCEDURE — U0003 INFECTIOUS AGENT DETECTION BY NUCLEIC ACID (DNA OR RNA); SEVERE ACUTE RESPIRATORY SYNDROME CORONAVIRUS 2 (SARS-COV-2) (CORONAVIRUS DISEASE [COVID-19]), AMPLIFIED PROBE TECHNIQUE, MAKING USE OF HIGH THROUGHPUT TECHNOLOGIES AS DESCRIBED BY CMS-2020-01-R: HCPCS

## 2022-05-23 PROCEDURE — 99207 PR NO CHARGE LOS: CPT

## 2022-05-23 PROCEDURE — U0005 INFEC AGEN DETEC AMPLI PROBE: HCPCS

## 2022-05-24 ENCOUNTER — TELEPHONE (OUTPATIENT)
Dept: NURSING | Facility: CLINIC | Age: 32
End: 2022-05-24
Payer: COMMERCIAL

## 2022-05-24 LAB — SARS-COV-2 RNA RESP QL NAA+PROBE: POSITIVE

## 2022-05-24 NOTE — TELEPHONE ENCOUNTER
"Coronavirus (COVID-19) Notification    Caller Name (Patient, parent, daughter/son, grandparent, etc)  The patient      Reason for call  Notify of Positive Coronavirus (COVID-19) lab results, assess symptoms,  review [facility] recommendations    Lab Result    Lab test:  2019-nCoV rRt-PCR or SARS-CoV-2 PCR    Oropharyngeal AND/OR nasopharyngeal swabs is POSITIVE for 2019-nCoV RNA/SARS-COV-2 PCR (COVID-19 virus)    Brief introduction script  Introduce self then review script:  \"I am calling on behalf of [employer].  We were notified that your Coronavirus test (COVID-19) for was POSITIVE for the virus.\"    Gather patient reported symptoms   Assessment   Current Symptoms at time of phone call, reported by patient: (if no symptoms, document No symptoms] No    Date of Symptom(s) onset (if applicable) NA     If at time of call, Patients symptoms hare worsened, the Patient should contact 911 or have someone drive them to Emergency Dept promptly:      If Patient calling 911, inform 911 personal that you have tested positive for the Coronavirus (COVID-19).  Place mask on and await 911 to arrive.    If Emergency Dept, If possible, please have another adult drive you to the Emergency Dept but you need to wear mask when in contact with other people.      Monoclonal Antibody Administration    You may be eligible to receive a new treatment with a monoclonal antibody for preventing hospitalization in patients at high risk for complications from COVID-19. This medication is still experimental and available on a limited basis; it is given through an IV and must be given at an infusion center. Please note that not all people who are eligible will receive the medication since it is in limited supply.  Is the patient symptomatic and onset of symptoms within the last 7 days? No. Patient does not qualify.    Review information with Patient    Your result was positive. This means you have COVID-19 (coronavirus).      How can I " protect others?    These guidelines are for isolating before returning to work, school or .       If you DO have symptoms:  o Stay home and away from others  - For at least 5 days after your symptoms started, AND   - You are fever free for 24 hours (with no medicine that reduces fever), AND  - Your other symptoms are better.  o Wear a mask for 10 full days any time you are around others.    If you DON'T have symptoms:  o Stay at home and away from others for at least 5 days after your positive test.  o Wear a mask for 10 full days any time you are around others.    There may be different guidelines for healthcare facilities. Please check with the specific sites before arriving.     If you've been told by a doctor that you were severely ill with COVID-19 or are immunocompromised, you should isolate for at least 10 days.    You should not go back to work until you meet the guidelines above for ending your home isolation. You don't need to be retested for COVID-19 before going back to work--studies show that you won't spread the virus if it's been at least 10 days since your symptoms started (or 20 days, if you have a weak immune system).    Employers, schools, and daycares: This is an official notice for this person's medical guidelines for returning in-person. They must meet the above guidelines before going back to work, school, or  in person.    You will receive a positive COVID-19 letter via [employer] or the mail soon with additional self-care information (exception, no letters sent to presurgical/preprocedure patients).     Would you like me to review some of that information with you now?  No    If you were tested for an upcoming procedure, please contact your provider for next steps.     Cathryn Hendrix LPN

## 2022-08-02 ENCOUNTER — TRANSFERRED RECORDS (OUTPATIENT)
Dept: HEALTH INFORMATION MANAGEMENT | Facility: CLINIC | Age: 32
End: 2022-08-02

## 2022-08-02 ENCOUNTER — MEDICAL CORRESPONDENCE (OUTPATIENT)
Dept: HEALTH INFORMATION MANAGEMENT | Facility: CLINIC | Age: 32
End: 2022-08-02

## 2022-08-05 ENCOUNTER — LAB (OUTPATIENT)
Dept: LAB | Facility: CLINIC | Age: 32
End: 2022-08-05
Payer: COMMERCIAL

## 2022-08-05 DIAGNOSIS — F20.0 SCHIZOPHRENIA, PARANOID, CHRONIC (H): Primary | ICD-10-CM

## 2022-08-05 LAB
CHOLEST SERPL-MCNC: 226 MG/DL
FASTING STATUS PATIENT QL REPORTED: ABNORMAL
GLUCOSE SERPL-MCNC: 105 MG/DL (ref 70–99)
HBA1C MFR BLD: 5.7 %
HDLC SERPL-MCNC: 38 MG/DL
HGB BLD-MCNC: 15.3 G/DL (ref 13.3–17.7)
LDLC SERPL CALC-MCNC: 125 MG/DL
NONHDLC SERPL-MCNC: 188 MG/DL
PROLACTIN SERPL 3RD IS-MCNC: 70 NG/ML (ref 4–15)
TRIGL SERPL-MCNC: 316 MG/DL

## 2022-08-05 PROCEDURE — 84146 ASSAY OF PROLACTIN: CPT

## 2022-08-05 PROCEDURE — 82947 ASSAY GLUCOSE BLOOD QUANT: CPT

## 2022-08-05 PROCEDURE — 85018 HEMOGLOBIN: CPT

## 2022-08-05 PROCEDURE — 36415 COLL VENOUS BLD VENIPUNCTURE: CPT

## 2022-08-05 PROCEDURE — 80061 LIPID PANEL: CPT

## 2022-08-05 PROCEDURE — 83036 HEMOGLOBIN GLYCOSYLATED A1C: CPT

## 2022-08-24 ENCOUNTER — OFFICE VISIT (OUTPATIENT)
Dept: FAMILY MEDICINE | Facility: CLINIC | Age: 32
End: 2022-08-24
Payer: COMMERCIAL

## 2022-08-24 VITALS
BODY MASS INDEX: 33.86 KG/M2 | HEART RATE: 111 BPM | RESPIRATION RATE: 16 BRPM | WEIGHT: 213.2 LBS | DIASTOLIC BLOOD PRESSURE: 85 MMHG | OXYGEN SATURATION: 95 % | TEMPERATURE: 98.5 F | SYSTOLIC BLOOD PRESSURE: 126 MMHG

## 2022-08-24 DIAGNOSIS — Z00.00 ROUTINE GENERAL MEDICAL EXAMINATION AT A HEALTH CARE FACILITY: Primary | ICD-10-CM

## 2022-08-24 DIAGNOSIS — Z79.899 HIGH RISK MEDICATION USE: ICD-10-CM

## 2022-08-24 DIAGNOSIS — R73.03 PREDIABETES: ICD-10-CM

## 2022-08-24 DIAGNOSIS — R00.0 TACHYCARDIA: ICD-10-CM

## 2022-08-24 DIAGNOSIS — R21 GROIN RASH: ICD-10-CM

## 2022-08-24 LAB
T4 FREE SERPL-MCNC: 0.78 NG/DL (ref 0.9–1.7)
TSH SERPL DL<=0.005 MIU/L-ACNC: 68.7 UIU/ML (ref 0.3–4.2)

## 2022-08-24 PROCEDURE — 99395 PREV VISIT EST AGE 18-39: CPT | Mod: 25 | Performed by: STUDENT IN AN ORGANIZED HEALTH CARE EDUCATION/TRAINING PROGRAM

## 2022-08-24 PROCEDURE — 84443 ASSAY THYROID STIM HORMONE: CPT | Performed by: STUDENT IN AN ORGANIZED HEALTH CARE EDUCATION/TRAINING PROGRAM

## 2022-08-24 PROCEDURE — 36415 COLL VENOUS BLD VENIPUNCTURE: CPT | Performed by: STUDENT IN AN ORGANIZED HEALTH CARE EDUCATION/TRAINING PROGRAM

## 2022-08-24 PROCEDURE — 90471 IMMUNIZATION ADMIN: CPT | Performed by: STUDENT IN AN ORGANIZED HEALTH CARE EDUCATION/TRAINING PROGRAM

## 2022-08-24 PROCEDURE — 84439 ASSAY OF FREE THYROXINE: CPT | Performed by: STUDENT IN AN ORGANIZED HEALTH CARE EDUCATION/TRAINING PROGRAM

## 2022-08-24 PROCEDURE — 90715 TDAP VACCINE 7 YRS/> IM: CPT | Performed by: STUDENT IN AN ORGANIZED HEALTH CARE EDUCATION/TRAINING PROGRAM

## 2022-08-24 RX ORDER — METFORMIN HCL 500 MG
500 TABLET, EXTENDED RELEASE 24 HR ORAL
Qty: 90 UNITS | Refills: 3 | Status: SHIPPED | OUTPATIENT
Start: 2022-08-24 | End: 2022-08-31

## 2022-08-24 ASSESSMENT — ENCOUNTER SYMPTOMS
HEARTBURN: 0
ARTHRALGIAS: 0
DIZZINESS: 0
EYE PAIN: 0
WEAKNESS: 0
HEMATOCHEZIA: 0
FREQUENCY: 0
ABDOMINAL PAIN: 0
DIARRHEA: 0
CHILLS: 0
SORE THROAT: 0
FEVER: 0
HEADACHES: 0
MYALGIAS: 0
CONSTIPATION: 0
HEMATURIA: 0
NERVOUS/ANXIOUS: 0
COUGH: 0
PALPITATIONS: 0
PARESTHESIAS: 0
SHORTNESS OF BREATH: 0
JOINT SWELLING: 0
NAUSEA: 0
DYSURIA: 0

## 2022-08-24 ASSESSMENT — ACTIVITIES OF DAILY LIVING (ADL): CURRENT_FUNCTION: NO ASSISTANCE NEEDED

## 2022-08-25 NOTE — PROGRESS NOTES
Preceptor Attestation:    I discussed the patient with the resident and evaluated the patient in person. I have verified the content of the note, which accurately reflects my assessment of the patient and the plan of care.   Supervising Physician:  Juve Monzon MD.

## 2022-08-31 DIAGNOSIS — R73.03 PREDIABETES: ICD-10-CM

## 2022-08-31 DIAGNOSIS — Z79.899 HIGH RISK MEDICATION USE: ICD-10-CM

## 2022-08-31 RX ORDER — METFORMIN HCL 500 MG
2000 TABLET, EXTENDED RELEASE 24 HR ORAL
Qty: 360 TABLET | Refills: 3 | Status: SHIPPED | OUTPATIENT
Start: 2022-08-31 | End: 2022-09-12

## 2022-09-01 ENCOUNTER — TELEPHONE (OUTPATIENT)
Dept: FAMILY MEDICINE | Facility: CLINIC | Age: 32
End: 2022-09-01

## 2022-09-01 DIAGNOSIS — Z79.899 HIGH RISK MEDICATION USE: ICD-10-CM

## 2022-09-01 DIAGNOSIS — R73.03 PREDIABETES: ICD-10-CM

## 2022-09-01 NOTE — TELEPHONE ENCOUNTER
[facility] phone call message- medication clarification/question:    Full Medication Name: metFORMIN (GLUCOPHAGE XR) 500 MG 24 hr tablet       Dose: Take 4 tablets (2,000 mg) by mouth daily with food - Oral    Question: Dosage seems high and the pharmacist just wants to clarify.    Pharmacy confirmed as  [facility] -  [place], [place] -   [address]   Yes    OK to leave a message on voice mail? Yes    Primary language: English      needed? No    Call taken on September 1, 2022 at 2:42 PM by César Schuler

## 2022-09-12 RX ORDER — METFORMIN HCL 500 MG
500 TABLET, EXTENDED RELEASE 24 HR ORAL
Qty: 90 TABLET | Refills: 3 | Status: SHIPPED | OUTPATIENT
Start: 2022-09-12 | End: 2023-08-17

## 2022-09-12 NOTE — TELEPHONE ENCOUNTER
I reviewed chart.  Discussion at time of visit was to use metformin  mg daily.  I changed Rx and called pharmacy.

## 2022-09-28 DIAGNOSIS — E03.9 HYPOTHYROIDISM, UNSPECIFIED TYPE: Primary | ICD-10-CM

## 2022-09-28 DIAGNOSIS — Z53.9 ERRONEOUS ENCOUNTER--DISREGARD: Primary | ICD-10-CM

## 2022-09-28 RX ORDER — LEVOTHYROXINE SODIUM 125 UG/1
TABLET ORAL
Qty: 90 TABLET | Refills: 0 | Status: SHIPPED | OUTPATIENT
Start: 2022-09-28 | End: 2022-11-23

## 2022-10-31 ENCOUNTER — TRANSFERRED RECORDS (OUTPATIENT)
Dept: HEALTH INFORMATION MANAGEMENT | Facility: CLINIC | Age: 32
End: 2022-10-31

## 2022-11-08 DIAGNOSIS — Z79.899 HIGH RISK MEDICATION USE: ICD-10-CM

## 2022-11-08 DIAGNOSIS — E03.9 HYPOTHYROIDISM, UNSPECIFIED TYPE: Primary | ICD-10-CM

## 2022-11-11 ENCOUNTER — OFFICE VISIT (OUTPATIENT)
Dept: FAMILY MEDICINE | Facility: CLINIC | Age: 32
End: 2022-11-11
Payer: COMMERCIAL

## 2022-11-11 VITALS
RESPIRATION RATE: 16 BRPM | BODY MASS INDEX: 34.3 KG/M2 | WEIGHT: 216 LBS | DIASTOLIC BLOOD PRESSURE: 72 MMHG | OXYGEN SATURATION: 98 % | HEART RATE: 117 BPM | TEMPERATURE: 98.5 F | SYSTOLIC BLOOD PRESSURE: 136 MMHG

## 2022-11-11 DIAGNOSIS — R00.0 TACHYCARDIA: Primary | ICD-10-CM

## 2022-11-11 DIAGNOSIS — E03.9 HYPOTHYROIDISM, UNSPECIFIED TYPE: ICD-10-CM

## 2022-11-11 DIAGNOSIS — Z23 NEED FOR PROPHYLACTIC VACCINATION AND INOCULATION AGAINST INFLUENZA: ICD-10-CM

## 2022-11-11 DIAGNOSIS — K21.00 GASTROESOPHAGEAL REFLUX DISEASE WITH ESOPHAGITIS WITHOUT HEMORRHAGE: ICD-10-CM

## 2022-11-11 DIAGNOSIS — Z79.899 HIGH RISK MEDICATION USE: ICD-10-CM

## 2022-11-11 DIAGNOSIS — Z23 HIGH PRIORITY FOR 2019-NCOV VACCINE: ICD-10-CM

## 2022-11-11 LAB
PROLACTIN SERPL 3RD IS-MCNC: 47 NG/ML (ref 4–15)
TSH SERPL DL<=0.005 MIU/L-ACNC: 0.25 UIU/ML (ref 0.3–4.2)

## 2022-11-11 PROCEDURE — 84439 ASSAY OF FREE THYROXINE: CPT | Performed by: STUDENT IN AN ORGANIZED HEALTH CARE EDUCATION/TRAINING PROGRAM

## 2022-11-11 PROCEDURE — 84146 ASSAY OF PROLACTIN: CPT | Performed by: STUDENT IN AN ORGANIZED HEALTH CARE EDUCATION/TRAINING PROGRAM

## 2022-11-11 PROCEDURE — 90686 IIV4 VACC NO PRSV 0.5 ML IM: CPT | Performed by: STUDENT IN AN ORGANIZED HEALTH CARE EDUCATION/TRAINING PROGRAM

## 2022-11-11 PROCEDURE — G0008 ADMIN INFLUENZA VIRUS VAC: HCPCS | Performed by: STUDENT IN AN ORGANIZED HEALTH CARE EDUCATION/TRAINING PROGRAM

## 2022-11-11 PROCEDURE — 84443 ASSAY THYROID STIM HORMONE: CPT | Performed by: STUDENT IN AN ORGANIZED HEALTH CARE EDUCATION/TRAINING PROGRAM

## 2022-11-11 PROCEDURE — 0134A COVID-19,PF,MODERNA BIVALENT: CPT | Performed by: STUDENT IN AN ORGANIZED HEALTH CARE EDUCATION/TRAINING PROGRAM

## 2022-11-11 PROCEDURE — 99207 PR NO CHARGE LOS: CPT | Performed by: STUDENT IN AN ORGANIZED HEALTH CARE EDUCATION/TRAINING PROGRAM

## 2022-11-11 PROCEDURE — 99214 OFFICE O/P EST MOD 30 MIN: CPT | Mod: 25 | Performed by: STUDENT IN AN ORGANIZED HEALTH CARE EDUCATION/TRAINING PROGRAM

## 2022-11-11 PROCEDURE — 91313 COVID-19,PF,MODERNA BIVALENT: CPT | Performed by: STUDENT IN AN ORGANIZED HEALTH CARE EDUCATION/TRAINING PROGRAM

## 2022-11-11 PROCEDURE — 36415 COLL VENOUS BLD VENIPUNCTURE: CPT | Performed by: STUDENT IN AN ORGANIZED HEALTH CARE EDUCATION/TRAINING PROGRAM

## 2022-11-11 RX ORDER — QUETIAPINE FUMARATE 50 MG/1
50 TABLET, FILM COATED ORAL 4 TIMES DAILY
COMMUNITY
Start: 2022-10-20

## 2022-11-11 RX ORDER — QUETIAPINE FUMARATE 25 MG/1
TABLET, FILM COATED ORAL
COMMUNITY
Start: 2022-10-20

## 2022-11-11 RX ORDER — FAMOTIDINE 20 MG/1
20 TABLET, FILM COATED ORAL 2 TIMES DAILY PRN
Qty: 60 TABLET | Refills: 11 | Status: SHIPPED | OUTPATIENT
Start: 2022-11-11 | End: 2024-08-28

## 2022-11-11 NOTE — LETTER
November 15, 2022      Devan Dunn  [address] [id number]        Dear ,    We are writing to inform you of your test results.    TSH improved quite a bit since you started taking levothyroxine.  Continue taking it without a change in dose.  Please make a follow up appointment in 3 months if you have not already done so.     Prolactin is improved from before.  This is a lab test that your psychiatrist follows.  Please inform him of this result at your next visit.       Resulted Orders   TSH with free T4 reflex   Result Value Ref Range    TSH 0.25 (L) 0.30 - 4.20 uIU/mL   Prolactin   Result Value Ref Range    Prolactin 47 (H) 4 - 15 ng/mL   T4 free   Result Value Ref Range    Free T4 1.61 0.90 - 1.70 ng/dL       If you have any questions or concerns, please call the clinic at the number listed above.       I hope you have a nice Thanksgiving!      Sincerely,      Dr. Ingris Crawley MD

## 2022-11-11 NOTE — PROGRESS NOTES
Preceptor Attestation:  I discussed the patient with the resident and evaluated the patient in person. I have verified the content of the note, which accurately reflects my assessment of the patient and the plan of care.  Supervising Physician:  Paige Patiño MD.

## 2022-11-11 NOTE — PROGRESS NOTES
Assessment & Plan     Hypothyroidism, unspecified type  Improved.  No side effects so will keep the same dose and recheck in another 3 months to make sure it doesn't get too low.    - TSH with free T4 reflex    High risk medication use  Has been stable.  Follows with Dr. George at [facility].   - Prolactin    Need for prophylactic vaccination and inoculation against influenza  - INFLUENZA VACCINE IM > 6 MONTHS VALENT IIV4 (AFLURIA/FLUZONE)    High priority for 2019-nCoV vaccine  - COVID-19,PF,MODERNA BIVALENT 18+Yrs    Tachycardia  Longstanding and stable but has never been worked up.  Reviewed EKG from 2018 that showed NSR.  No evidence of Holter monitoring.  Discussed with Devan and [facility] staff.  He is asymptomatic.  Could be from some of his psychiatric medications.  Will follow.   - Holter Monitor 48 hour Adult Pediatric; Future    Gastroesophageal reflux disease with esophagitis without hemorrhage  Stable.  Refilled.   - famotidine (PEPCID) 20 MG tablet; Take 1 tablet (20 mg) by mouth 2 times daily as needed (heartburn)    Follow up in 3 months.    Staffed with Dr. Patiño.    Ingris Crawley MD  [facility]   Devan is a 32 year old accompanied by his Group home staff, presenting for the following health issues:  Follow Up (TSH RECHECK ), Imm/Inj (Flu Shot), and Imm/Inj (COVID-19 VACCINE)  Devan acts as historian.  Additional history is provided by the [facility] staff.     HPI     Tachycardia: Has been persistently tachycardic above 100 since 2013.  An EKG done in 2018 showed normal sinus rhythm with normal QTC.  He denies any symptoms.  No dizziness or palpitations.  No history of fainting.  No known family history of sudden death.  He has never seen a cardiologist and has never had a Holter monitor.    Hypothyroidism: Due to the tachycardia, checked TSH at his last annual physical.  TSH 60.7, FT4 0.78 on 8/24/2022 I prescribed levothyroxine 125 mcg which he has been  taking without side effects.    Weight gain: Mild.  Gained about 2 pounds since her last visit 3 months ago.  He has been eating more sweets, especially cookies for snacks.  We discussed healthy snack choices today, including limiting pop.  He currently drinks about 1 can of soda per day most days.  He likes biking water.  He will try to substitute that for soda.  We will recheck his weight at the next visit.  His recent A1c was 5.7%, up from prior.  He continues on daily metformin without side effects.  No dose changes today.    High risk medication use: He follows with [facility].  I reviewed his note from Dr. George from 9/5/2022.  His schizophrenia is currently well controlled.  We checked his prolactin level today, which has been gradually increasing.  He currently takes quetiapine 50 mg at bedtime, risperidone 4 mg twice daily, Seroquel 25 mg 3 times daily, clonazepam as needed, trihexyphenidyl 5 mg at bedtime, and venlafaxine 150 mg ER +37.5 mg ER daily.    Review of Systems   Constitutional, HEENT, cardiovascular, pulmonary, gi and gu systems are negative, except as otherwise noted.      Objective    /72 (BP Location: Right arm, Patient Position: Sitting, Cuff Size: Adult Regular)   Pulse 117   Temp 98.5  F (36.9  C) (Oral)   Resp 16   Wt 98 kg (216 lb)   SpO2 98%   BMI 34.30 kg/m    Body mass index is 34.3 kg/m .  Physical Exam   GENERAL: healthy, alert and no distress  EYES: Eyes grossly normal to inspection, PERRL and conjunctivae and sclerae normal  NECK: no adenopathy, no asymmetry, masses, or scars and thyroid normal to palpation  RESP: lungs clear to auscultation - no rales, rhonchi or wheezes  CV: regular rate and rhythm, normal S1 S2, no S3 or S4, no murmur, click or rub, no peripheral edema and peripheral pulses strong  ABDOMEN: soft, nontender, no hepatosplenomegaly, no masses and hypoactive bowel sounds normal  MS: no gross musculoskeletal defects noted, no edema  SKIN: no  suspicious lesions or rashes  NEURO: Normal strength and tone, mentation intact and speech normal  PSYCH: mentation at baseline, affect normal/bright    No results found for this or any previous visit (from the past 24 hour(s)).    ----- Service Performed and Documented by Resident or Fellow ------

## 2022-11-12 LAB — T4 FREE SERPL-MCNC: 1.61 NG/DL (ref 0.9–1.7)

## 2022-11-22 ENCOUNTER — HOSPITAL ENCOUNTER (OUTPATIENT)
Dept: CARDIOLOGY | Facility: CLINIC | Age: 32
Discharge: HOME | End: 2022-11-22
Attending: STUDENT IN AN ORGANIZED HEALTH CARE EDUCATION/TRAINING PROGRAM | Admitting: STUDENT IN AN ORGANIZED HEALTH CARE EDUCATION/TRAINING PROGRAM
Payer: COMMERCIAL

## 2022-11-22 DIAGNOSIS — R00.0 TACHYCARDIA: ICD-10-CM

## 2022-11-22 PROCEDURE — 93225 XTRNL ECG REC<48 HRS REC: CPT

## 2023-01-25 ENCOUNTER — LAB (OUTPATIENT)
Dept: LAB | Facility: CLINIC | Age: 33
End: 2023-01-25
Payer: COMMERCIAL

## 2023-01-25 DIAGNOSIS — F20.0 SCHIZOPHRENIA, PARANOID, CHRONIC (H): Primary | ICD-10-CM

## 2023-01-25 DIAGNOSIS — Z79.899 HIGH RISK MEDICATION USE: ICD-10-CM

## 2023-01-25 LAB
CHOLEST SERPL-MCNC: 214 MG/DL
FASTING STATUS PATIENT QL REPORTED: NO
GLUCOSE SERPL-MCNC: 191 MG/DL (ref 70–99)
HBA1C MFR BLD: 5.6 % (ref 0–5.6)
HDLC SERPL-MCNC: 38 MG/DL
LDLC SERPL CALC-MCNC: 143 MG/DL
NONHDLC SERPL-MCNC: 176 MG/DL
PROLACTIN SERPL 3RD IS-MCNC: 40 NG/ML (ref 4–15)
TRIGL SERPL-MCNC: 164 MG/DL

## 2023-01-25 PROCEDURE — 36415 COLL VENOUS BLD VENIPUNCTURE: CPT

## 2023-01-25 PROCEDURE — 83036 HEMOGLOBIN GLYCOSYLATED A1C: CPT

## 2023-01-25 PROCEDURE — 80061 LIPID PANEL: CPT

## 2023-01-25 PROCEDURE — 82947 ASSAY GLUCOSE BLOOD QUANT: CPT

## 2023-01-25 PROCEDURE — 84146 ASSAY OF PROLACTIN: CPT

## 2023-02-08 ENCOUNTER — OFFICE VISIT (OUTPATIENT)
Dept: FAMILY MEDICINE | Facility: CLINIC | Age: 33
End: 2023-02-08
Payer: COMMERCIAL

## 2023-02-08 VITALS
OXYGEN SATURATION: 97 % | BODY MASS INDEX: 33.67 KG/M2 | HEART RATE: 93 BPM | WEIGHT: 212 LBS | TEMPERATURE: 98.1 F | SYSTOLIC BLOOD PRESSURE: 118 MMHG | DIASTOLIC BLOOD PRESSURE: 81 MMHG | RESPIRATION RATE: 16 BRPM

## 2023-02-08 DIAGNOSIS — E03.9 HYPOTHYROIDISM, UNSPECIFIED TYPE: ICD-10-CM

## 2023-02-08 DIAGNOSIS — E55.9 VITAMIN D DEFICIENCY, UNSPECIFIED: ICD-10-CM

## 2023-02-08 DIAGNOSIS — R73.09 ELEVATED GLUCOSE LEVEL: Primary | ICD-10-CM

## 2023-02-08 DIAGNOSIS — E55.9 VITAMIN D INSUFFICIENCY: ICD-10-CM

## 2023-02-08 DIAGNOSIS — R73.03 PREDIABETES: ICD-10-CM

## 2023-02-08 DIAGNOSIS — R73.09 ELEVATED GLUCOSE LEVEL: ICD-10-CM

## 2023-02-08 LAB
ANION GAP SERPL CALCULATED.3IONS-SCNC: 12 MMOL/L (ref 7–15)
BUN SERPL-MCNC: 10.2 MG/DL (ref 6–20)
CALCIUM SERPL-MCNC: 9.9 MG/DL (ref 8.6–10)
CHLORIDE SERPL-SCNC: 102 MMOL/L (ref 98–107)
CREAT SERPL-MCNC: 1.2 MG/DL (ref 0.67–1.17)
DEPRECATED HCO3 PLAS-SCNC: 26 MMOL/L (ref 22–29)
GFR SERPL CREATININE-BSD FRML MDRD: 81 ML/MIN/1.73M2
GLUCOSE SERPL-MCNC: 104 MG/DL (ref 70–99)
POTASSIUM SERPL-SCNC: 4.8 MMOL/L (ref 3.4–5.3)
SODIUM SERPL-SCNC: 140 MMOL/L (ref 136–145)
TSH SERPL DL<=0.005 MIU/L-ACNC: 0.79 UIU/ML (ref 0.3–4.2)

## 2023-02-08 PROCEDURE — 84443 ASSAY THYROID STIM HORMONE: CPT | Performed by: STUDENT IN AN ORGANIZED HEALTH CARE EDUCATION/TRAINING PROGRAM

## 2023-02-08 PROCEDURE — 80048 BASIC METABOLIC PNL TOTAL CA: CPT | Performed by: STUDENT IN AN ORGANIZED HEALTH CARE EDUCATION/TRAINING PROGRAM

## 2023-02-08 PROCEDURE — 36415 COLL VENOUS BLD VENIPUNCTURE: CPT | Performed by: STUDENT IN AN ORGANIZED HEALTH CARE EDUCATION/TRAINING PROGRAM

## 2023-02-08 PROCEDURE — 99213 OFFICE O/P EST LOW 20 MIN: CPT | Mod: GC | Performed by: STUDENT IN AN ORGANIZED HEALTH CARE EDUCATION/TRAINING PROGRAM

## 2023-02-08 PROCEDURE — 82306 VITAMIN D 25 HYDROXY: CPT | Performed by: STUDENT IN AN ORGANIZED HEALTH CARE EDUCATION/TRAINING PROGRAM

## 2023-02-08 NOTE — PROGRESS NOTES
Assessment & Plan   Encounter Date: Feb 8, 2023    Prediabetes  Discussed healthy diet and lifestyle.  Last glu 191.  He is fasting today.  His glucose is 104.  Cr is 1.2 in the setting of fasting. Prediabetes is stable.  He is on high-risk medication through his psychatrist and gets periodic lab monitoring.   - Vitamin D deficiency screening    Vitamin D insufficiency  Vit D = 19. Mild insufficiency.  Sending rx for 1000 international unit(s) to pharmacy to take Oct to April  - Vitamin D deficiency screening    Elevated glucose level  - Basic metabolic panel    Hypothyroidism, unspecified type  TSH wnl today. No changes made to levothyroxine dosage.  - TSH with free T4 reflex    Ingris Crawley MD PGY-3   [facility]   Staffed with Dr. Solano.        ______________________________________________________    Subjective       CC: Follow Up (Follow up from last visit )    HPI:  Mr. Devan Dunn is a(n) 32 year old male who presents today as a return patient for:    1. Lab follow up:  TSH was high 5 months ago, started levothyroxine, 2 months ago came down to 0.25.  I asked him to return to recheck the thyroid levels and to potentially adjust the dosage if needed.     2. Prediabetes:  Weight is stable.  No polyuria or polydipsia.         Patient is otherwise feeling well, without additional concerns.      Medications:  Current Outpatient Medications   Medication     clonazePAM (KLONOPIN) 0.5 MG tablet     doxepin (SINEQUAN) 50 MG capsule     famotidine (PEPCID) 20 MG tablet     levothyroxine (SYNTHROID/LEVOTHROID) 125 MCG tablet     metFORMIN (GLUCOPHAGE XR) 500 MG 24 hr tablet     QUEtiapine (SEROQUEL) 25 MG tablet     QUEtiapine (SEROQUEL) 50 MG tablet     risperiDONE (RISPERDAL) 4 MG tablet     trihexyphenidyl (ARTANE) 5 MG tablet     venlafaxine (EFFEXOR-XR) 150 MG 24 hr capsule     venlafaxine (EFFEXOR-XR) 37.5 MG 24 hr capsule     No current facility-administered medications for this  visit.      Past Medical History:   Patient Active Problem List   Diagnosis     Health Care Home     Pervasive developmental disorder     Schizophrenic disorder (H)     Hepatic steatosis     Elevated liver enzymes     Elevated lipids     Schizophrenia, paranoid, chronic (H)     Elevated prolactin level     CANDI (generalized anxiety disorder)     Insomnia     High risk medication use     Prediabetes     Tachycardia     Past Medical History:   Diagnosis Date     Autism disorder      Depressive disorder      Schizophrenia (H)               Objective     /81 (BP Location: Left arm, Patient Position: Sitting, Cuff Size: Adult Regular)   Pulse 93   Temp 98.1  F (36.7  C) (Oral)   Resp 16   Wt 96.2 kg (212 lb)   SpO2 97%   BMI 33.67 kg/m      Pertinent Physical Exam Findings    Gen:  Appears stated age.  Casually groomed.   HEENT:    Neck: thyroid is normal in size. No palpable nodules.     CV:  Tachycardia, no murmur  Resp:  CTAB in post lung fields.  Neuropsych:  Mood is good.       Results for orders placed or performed in visit on 02/08/23   Vitamin D deficiency screening     Status: Abnormal   Result Value Ref Range    Vitamin D, Total (25-Hydroxy) 19 (L) 20 - 75 ug/L    Narrative    Season, race, dietary intake, and treatment affect the concentration of 25-hydroxy-Vitamin D. Values may decrease during winter months and increase during summer months. Values 20-29 ug/L may indicate Vitamin D insufficiency and values <20 ug/L may indicate Vitamin D deficiency.    Vitamin D determination is routinely performed by an immunoassay specific for 25 hydroxyvitamin D3.  If an individual is on vitamin D2(ergocalciferol) supplementation, please specify 25 OH vitamin D2 and D3 level determination by LCMSMS test VITD23.     Basic metabolic panel     Status: Abnormal   Result Value Ref Range    Sodium 140 136 - 145 mmol/L    Potassium 4.8 3.4 - 5.3 mmol/L    Chloride 102 98 - 107 mmol/L    Carbon Dioxide (CO2) 26 22 - 29  mmol/L    Anion Gap 12 7 - 15 mmol/L    Urea Nitrogen 10.2 6.0 - 20.0 mg/dL    Creatinine 1.20 (H) 0.67 - 1.17 mg/dL    Calcium 9.9 8.6 - 10.0 mg/dL    Glucose 104 (H) 70 - 99 mg/dL    GFR Estimate 81 >60 mL/min/1.73m2   TSH with free T4 reflex     Status: Normal   Result Value Ref Range    TSH 0.79 0.30 - 4.20 uIU/mL                ----- Service Performed and Documented by Resident or Fellow ------

## 2023-02-08 NOTE — Clinical Note
Shawn Downing, Please call pt and let his group home know that his vitamin D was a little low, so he should take vitamin D that I sent to the pharmacy.  He should take it Oct-April every year. I sent plenty of refills.  Thank you!

## 2023-02-08 NOTE — PATIENT INSTRUCTIONS
No medication changes today    We will call if you need any prescriptions or changes to medications    We will send a letter with the results

## 2023-02-09 LAB — DEPRECATED CALCIDIOL+CALCIFEROL SERPL-MC: 19 UG/L (ref 20–75)

## 2023-02-10 PROBLEM — E03.9 HYPOTHYROIDISM, UNSPECIFIED TYPE: Status: ACTIVE | Noted: 2023-02-10

## 2023-02-10 RX ORDER — VITAMIN B COMPLEX
25 TABLET ORAL DAILY
Qty: 90 TABLET | Refills: 3 | Status: SHIPPED | OUTPATIENT
Start: 2023-02-10 | End: 2024-08-28

## 2023-02-13 DIAGNOSIS — E03.9 HYPOTHYROIDISM, UNSPECIFIED TYPE: ICD-10-CM

## 2023-02-14 RX ORDER — LEVOTHYROXINE SODIUM 125 UG/1
TABLET ORAL
Qty: 31 TABLET | Refills: 11 | Status: SHIPPED | OUTPATIENT
Start: 2023-02-14 | End: 2024-08-28

## 2023-04-26 ENCOUNTER — TRANSFERRED RECORDS (OUTPATIENT)
Dept: HEALTH INFORMATION MANAGEMENT | Facility: CLINIC | Age: 33
End: 2023-04-26
Payer: COMMERCIAL

## 2023-06-20 ENCOUNTER — APPOINTMENT (RX ONLY)
Dept: URBAN - METROPOLITAN AREA CLINIC 41 | Facility: CLINIC | Age: 33
Setting detail: DERMATOLOGY
End: 2023-06-20

## 2023-06-20 DIAGNOSIS — D22 MELANOCYTIC NEVI: ICD-10-CM

## 2023-06-20 DIAGNOSIS — L20.89 OTHER ATOPIC DERMATITIS: ICD-10-CM | Status: INADEQUATELY CONTROLLED

## 2023-06-20 PROBLEM — D22.5 MELANOCYTIC NEVI OF TRUNK: Status: ACTIVE | Noted: 2023-06-20

## 2023-06-20 PROBLEM — L20.84 INTRINSIC (ALLERGIC) ECZEMA: Status: ACTIVE | Noted: 2023-06-20

## 2023-06-20 PROCEDURE — ? PRESCRIPTION MEDICATION MANAGEMENT

## 2023-06-20 PROCEDURE — ? COUNSELING

## 2023-06-20 PROCEDURE — 99203 OFFICE O/P NEW LOW 30 MIN: CPT

## 2023-06-20 PROCEDURE — ? FULL BODY SKIN EXAM - DECLINED

## 2023-06-20 PROCEDURE — ? ADDITIONAL NOTES

## 2023-06-20 PROCEDURE — ? PRESCRIPTION

## 2023-06-20 RX ORDER — BETAMETHASONE DIPROPIONATE 0.5 MG/G
CREAM TOPICAL
Qty: 45 | Refills: 1 | Status: ERX | COMMUNITY
Start: 2023-06-20

## 2023-06-20 RX ORDER — HYDROXYZINE HYDROCHLORIDE 25 MG/1
TABLET, FILM COATED ORAL
Qty: 30 | Refills: 0 | Status: ERX | COMMUNITY
Start: 2023-06-20

## 2023-06-20 RX ADMIN — HYDROXYZINE HYDROCHLORIDE: 25 TABLET, FILM COATED ORAL at 00:00

## 2023-06-20 RX ADMIN — BETAMETHASONE DIPROPIONATE: 0.5 CREAM TOPICAL at 00:00

## 2023-06-20 ASSESSMENT — LOCATION DETAILED DESCRIPTION DERM
LOCATION DETAILED: RIGHT PROXIMAL RADIAL DORSAL FOREARM
LOCATION DETAILED: LEFT PROXIMAL DORSAL FOREARM
LOCATION DETAILED: RIGHT SUPERIOR LATERAL LOWER BACK
LOCATION DETAILED: RIGHT ANTERIOR PROXIMAL UPPER ARM
LOCATION DETAILED: LEFT RADIAL DORSAL HAND
LOCATION DETAILED: RIGHT ULNAR DORSAL HAND
LOCATION DETAILED: RIGHT INFERIOR OCCIPITAL SCALP

## 2023-06-20 ASSESSMENT — LOCATION SIMPLE DESCRIPTION DERM
LOCATION SIMPLE: RIGHT HAND
LOCATION SIMPLE: POSTERIOR SCALP
LOCATION SIMPLE: LEFT HAND
LOCATION SIMPLE: LEFT FOREARM
LOCATION SIMPLE: RIGHT UPPER ARM
LOCATION SIMPLE: RIGHT LOWER BACK
LOCATION SIMPLE: RIGHT FOREARM

## 2023-06-20 ASSESSMENT — LOCATION ZONE DERM
LOCATION ZONE: SCALP
LOCATION ZONE: ARM
LOCATION ZONE: HAND
LOCATION ZONE: TRUNK

## 2023-06-20 NOTE — PROCEDURE: ADDITIONAL NOTES
Render Risk Assessment In Note?: no
Detail Level: Simple
Additional Notes: Monica Soto PA-C notes that with running, friction and sweat alone can create irritation. \\n\\nTM notes we will treat with steroids and antihistamines.  \\n\\nConsider psoriasis if not improved on elbow area. \\n\\nAvoid running outdoors until rash resolved.  Cooler showers, sensitive soap and lotion.

## 2023-06-20 NOTE — PROCEDURE: PRESCRIPTION MEDICATION MANAGEMENT
Detail Level: Zone
Initiate Treatment: Betamethasone cream bid x 2 weeks\\nHydroxyzine 25mg tablets 1 hour before bedtime
Continue Regimen: Allegra in AM\\n\\nBenadryl topical if helps
Render In Strict Bullet Format?: Yes

## 2023-06-20 NOTE — PROCEDURE: FULL BODY SKIN EXAM - DECLINED
Body Of Note (Please Add Your Own Text Here): Monica Soto PA-C performs upper body exam. Patient notes that he recently had a skin exam a few weeks ago at other derm office. Monica Soto PA-C notes that all moles on upper body appear to be benign in nature.
Price (Do Not Change): 0.00
Instructions: This plan will send the code FBSD to the PM system.  DO NOT or CHANGE the price.
Detail Level: Simple

## 2023-06-20 NOTE — HPI: RASH
What Type Of Note Output Would You Prefer (Optional)?: Standard Output
How Severe Is Your Rash?: mild
Is This A New Presentation, Or A Follow-Up?: Rash
Additional History: Pt notes rash began on his L hand and has spread to other parts of his body, he notes Benadryl cream is helpful

## 2023-07-24 ENCOUNTER — TRANSFERRED RECORDS (OUTPATIENT)
Dept: HEALTH INFORMATION MANAGEMENT | Facility: CLINIC | Age: 33
End: 2023-07-24
Payer: COMMERCIAL

## 2023-08-17 DIAGNOSIS — R73.03 PREDIABETES: ICD-10-CM

## 2023-08-17 DIAGNOSIS — Z79.899 HIGH RISK MEDICATION USE: ICD-10-CM

## 2023-08-17 RX ORDER — METFORMIN HCL 500 MG
500 TABLET, EXTENDED RELEASE 24 HR ORAL
Qty: 31 TABLET | Refills: 0 | Status: SHIPPED | OUTPATIENT
Start: 2023-08-17 | End: 2023-09-08

## 2023-08-30 ENCOUNTER — OFFICE VISIT (OUTPATIENT)
Dept: FAMILY MEDICINE | Facility: CLINIC | Age: 33
End: 2023-08-30
Payer: COMMERCIAL

## 2023-08-30 VITALS
BODY MASS INDEX: 31.43 KG/M2 | RESPIRATION RATE: 16 BRPM | HEIGHT: 68 IN | TEMPERATURE: 98.7 F | SYSTOLIC BLOOD PRESSURE: 118 MMHG | WEIGHT: 207.4 LBS | HEART RATE: 110 BPM | OXYGEN SATURATION: 96 % | DIASTOLIC BLOOD PRESSURE: 84 MMHG

## 2023-08-30 DIAGNOSIS — Z00.00 WELLNESS EXAMINATION: Primary | ICD-10-CM

## 2023-08-30 DIAGNOSIS — E55.9 VITAMIN D DEFICIENCY: ICD-10-CM

## 2023-08-30 PROCEDURE — 36415 COLL VENOUS BLD VENIPUNCTURE: CPT

## 2023-08-30 PROCEDURE — 99395 PREV VISIT EST AGE 18-39: CPT | Mod: GC

## 2023-08-30 PROCEDURE — 82306 VITAMIN D 25 HYDROXY: CPT

## 2023-08-30 ASSESSMENT — ENCOUNTER SYMPTOMS
CHILLS: 0
HEMATURIA: 0
SORE THROAT: 0
HEADACHES: 0
FEVER: 0
DIZZINESS: 0
COUGH: 0
CONSTIPATION: 0
JOINT SWELLING: 0
ARTHRALGIAS: 0
ABDOMINAL PAIN: 0
EYE PAIN: 0
PALPITATIONS: 0
HEMATOCHEZIA: 0
NERVOUS/ANXIOUS: 0
HEARTBURN: 0
SHORTNESS OF BREATH: 0
NAUSEA: 0
FREQUENCY: 0
DYSURIA: 0
MYALGIAS: 0
DIARRHEA: 0
WEAKNESS: 0
PARESTHESIAS: 0

## 2023-08-30 ASSESSMENT — ACTIVITIES OF DAILY LIVING (ADL): CURRENT_FUNCTION: NO ASSISTANCE NEEDED

## 2023-08-30 ASSESSMENT — ANXIETY QUESTIONNAIRES
IF YOU CHECKED OFF ANY PROBLEMS ON THIS QUESTIONNAIRE, HOW DIFFICULT HAVE THESE PROBLEMS MADE IT FOR YOU TO DO YOUR WORK, TAKE CARE OF THINGS AT HOME, OR GET ALONG WITH OTHER PEOPLE: NOT DIFFICULT AT ALL
5. BEING SO RESTLESS THAT IT IS HARD TO SIT STILL: NOT AT ALL
3. WORRYING TOO MUCH ABOUT DIFFERENT THINGS: NEARLY EVERY DAY
GAD7 TOTAL SCORE: 6
7. FEELING AFRAID AS IF SOMETHING AWFUL MIGHT HAPPEN: NOT AT ALL
2. NOT BEING ABLE TO STOP OR CONTROL WORRYING: NEARLY EVERY DAY
GAD7 TOTAL SCORE: 6
1. FEELING NERVOUS, ANXIOUS, OR ON EDGE: NOT AT ALL
6. BECOMING EASILY ANNOYED OR IRRITABLE: NOT AT ALL

## 2023-08-30 ASSESSMENT — PATIENT HEALTH QUESTIONNAIRE - PHQ9
SUM OF ALL RESPONSES TO PHQ QUESTIONS 1-9: 3
5. POOR APPETITE OR OVEREATING: NOT AT ALL

## 2023-08-30 NOTE — PROGRESS NOTES
Preceptor Attestation:    I discussed the patient with the resident and evaluated the patient in person. I have verified the content of the note, which accurately reflects my assessment of the patient and the plan of care.   Supervising Physician:  Reagan Velasquez DO.

## 2023-08-30 NOTE — PROGRESS NOTES
"SUBJECTIVE:   CC: Devan is an 32 year old who presents for preventative health visit.   He reports no concerns at this time and states that he is going to try to increase his exercise by walking more. He denies nausea, headache, vomiting, fever, chills, shortness f breath, chest pain, abdominal pain, or changes in bowel or bladder patterns.       8/30/2023    10:57 AM   Additional Questions   Roomed by [facility]   Accompanied by Group home staff       Healthy Habits:     In general, how would you rate your overall health?  Excellent    Frequency of exercise:  None    Do you usually eat at least 4 servings of fruit and vegetables a day, include whole grains    & fiber and avoid regularly eating high fat or \"junk\" foods?  Yes    Taking medications regularly:  Yes    Medication side effects:  None    Ability to successfully perform activities of daily living:  No assistance needed    Home Safety:  No safety concerns identified    Hearing Impairment:  No hearing concerns    In the past 6 months, have you been bothered by leaking of urine?  No    In general, how would you rate your overall mental or emotional health?  Excellent    Additional concerns today:  No    Physical Health:  In general, how would you rate your overall physical health? excellent  Outside of work, how many days during the week do you exercise?none  Outside of work, approximately how many minutes a day do you exercise?not applicable  If you drink alcohol do you typically have >3 drinks per day or >7 drinks per week? No  Do you usually eat at least 4 servings of fruit and vegetables a day, include whole grains & fiber and avoid regularly eating high fat or \"junk\" foods? Yes  Do you have any problems taking medications regularly? No  Do you have any side effects from medications? none  Needs assistance for the following daily activities: no assistance needed  Which of the following safety concerns are present in your home?  none identified   Hearing " impairment: No  In the past 6 months, have you been bothered by leaking of urine? no    Mental Health:  In general, how would you rate your overall mental or emotional health? good  PHQ-2 Score:          2/8/2023    10:02 AM 11/11/2022     1:41 PM   PHQ-2 ( 1999 Pfizer)   Q1: Little interest or pleasure in doing things 0 0   Q2: Feeling down, depressed or hopeless 0 0   PHQ-2 Score 0 0       Do you have sleep apnea, excessive snoring or daytime drowsiness? : no    Social History     Tobacco Use    Smoking status: Never    Smokeless tobacco: Never   Substance Use Topics    Alcohol use: No         8/30/2023    10:56 AM   Alcohol Use   Prescreen: >3 drinks/day or >7 drinks/week? No     Do you have a current opioid prescription? No  Do you use any other controlled substances or medications that are not prescribed by a provider? None  {  Current providers sharing in care for this patient include: {  Patient Care Team:  Ronald oHpe DO as PCP - General (Student in organized health care education/training program)  Luanne Perez as   Iris Camacho APRN CNS (Clinical Nurse Specialist)  Veronica Joyner NP as Nurse Practitioner (Nurse Practitioner)  Myles George MD as MD (Psychiatry)  Ronald Hope DO as Assigned PCP    The following health maintenance items are reviewed in Epic and correct as of today:  Health Maintenance   Topic Date Due    MEDICARE ANNUAL WELLNESS VISIT  08/24/2023    INFLUENZA VACCINE (1) 09/01/2023    TSH W/FREE T4 REFLEX  02/08/2024    ADVANCE CARE PLANNING  08/25/2027    DTAP/TDAP/TD IMMUNIZATION (9 - Td or Tdap) 08/24/2032    HEPATITIS C SCREENING  Completed    HIV SCREENING  Completed    PHQ-2 (once per calendar year)  Completed    IPV IMMUNIZATION  Completed    HEPATITIS B IMMUNIZATION  Completed    COVID-19 Vaccine  Completed    Pneumococcal Vaccine: Pediatrics (0 to 5 Years) and At-Risk Patients (6 to 64 Years)  Aged Out    MENINGITIS IMMUNIZATION  Aged  Out       Appropriate preventive services were discussed with this patient, including applicable screening as appropriate for fall prevention, nutrition, physical activity, Tobacco-use cessation, weight loss and cognition.  Checklist reviewing preventive services available has been given to the patient.      Social History     Tobacco Use    Smoking status: Never    Smokeless tobacco: Never   Substance Use Topics    Alcohol use: No           8/30/2023    10:56 AM   Alcohol Use   Prescreen: >3 drinks/day or >7 drinks/week? No       Last PSA: No results found for: PSA    Reviewed orders with patient. Reviewed health maintenance and updated orders accordingly - Yes  Lab work is in process  -Pending Vitamin D level    Reviewed and updated as needed this visit by clinical staff   Tobacco  Allergies  Meds              Reviewed and updated as needed this visit by Provider                     Review of Systems   Constitutional:  Negative for chills and fever.   HENT:  Negative for congestion, ear pain, hearing loss and sore throat.    Eyes:  Negative for pain and visual disturbance.   Respiratory:  Negative for cough and shortness of breath.    Cardiovascular:  Negative for chest pain, palpitations and peripheral edema.   Gastrointestinal:  Negative for abdominal pain, constipation, diarrhea, heartburn, hematochezia and nausea.   Genitourinary:  Negative for dysuria, frequency, genital sores, hematuria, impotence, penile discharge and urgency.   Musculoskeletal:  Negative for arthralgias, joint swelling and myalgias.   Skin:  Negative for rash.   Neurological:  Negative for dizziness, weakness, headaches and paresthesias.   Psychiatric/Behavioral:  Negative for mood changes. The patient is not nervous/anxious.        OBJECTIVE:   There were no vitals taken for this visit.    Physical Exam  GENERAL: healthy, alert and no distress  EYES: Eyes grossly normal to inspection, PERRL and conjunctivae and sclerae normal  HENT:  ear canals and TM's normal, nose and mouth without ulcers or lesions  NECK: no adenopathy, no asymmetry, masses, or scars and thyroid normal to palpation  RESP: lungs clear to auscultation - no rales, rhonchi or wheezes  CV: regular rate and rhythm, normal S1 S2, no S3 or S4, no murmur, click or rub, no peripheral edema and peripheral pulses strong  ABDOMEN: soft, nontender, no hepatosplenomegaly, no masses and bowel sounds normal   (male): Deferred by patient   MS: no gross musculoskeletal defects noted, no edema  SKIN: no suspicious lesions or rashes  NEURO: Normal strength and tone, mentation intact and speech normal  PSYCH: somewhat withdrawn affect, but answers questions appropriately         ASSESSMENT/PLAN:   Devan was seen today for physical.    Diagnoses and all orders for this visit:    Wellness examination  -patient here for preventive wellness exam. No concerns at this time. Continue current medication management and recommend follow-up in one year or sooner if concerns arise.     Vitamin D deficiency  -Patient has a history of vitamin D deficiency - currently taking 25 mcg daily (1000 units)  -Discussed with patient that we will recheck vitamin D level today and consider increasing supplementation if still low. Patient and home staff verbalized clear understanding and agreement to treatment plan and had no questions or concerns at this time.  -     Vitamin D deficiency screening; Future  -     Vitamin D deficiency screening        COUNSELING:   Reviewed preventive health counseling, as reflected in patient instructions  Special attention given to:        Regular exercise        He reports that he has never smoked. He has never used smokeless tobacco.            Ronald Hope DO  [facility]

## 2023-08-31 LAB — DEPRECATED CALCIDIOL+CALCIFEROL SERPL-MC: 46 UG/L (ref 20–75)

## 2023-09-06 ENCOUNTER — TRANSFERRED RECORDS (OUTPATIENT)
Dept: HEALTH INFORMATION MANAGEMENT | Facility: CLINIC | Age: 33
End: 2023-09-06

## 2023-09-07 DIAGNOSIS — Z79.899 HIGH RISK MEDICATION USE: ICD-10-CM

## 2023-09-07 DIAGNOSIS — R73.03 PREDIABETES: ICD-10-CM

## 2023-09-08 RX ORDER — METFORMIN HCL 500 MG
TABLET, EXTENDED RELEASE 24 HR ORAL
Qty: 31 TABLET | Refills: 11 | Status: SHIPPED | OUTPATIENT
Start: 2023-09-08 | End: 2024-08-28

## 2023-09-08 NOTE — PROGRESS NOTES
"SUBJECTIVE:   CC: Devan Dunn is an 31 year old male who presents for preventative health visit.     Patient has been advised of split billing requirements and indicates understanding: Yes  Healthy Habits:     In general, how would you rate your overall health?  Good    Dental care twice a year:  Yes    Sleep apnea or symptoms of sleep apnea:  None    Diet:  Regular (no restrictions)    Frequency of exercise:  None    Do you usually eat at least 4 servings of fruit and vegetables a day, include whole grains    & fiber and avoid regularly eating high fat or \"junk\" foods?  No    Taking medications regularly:  No    Barriers to taking medications:  Problems remembering to take them    Medication side effects:  Other    Ability to successfully perform activities of daily living:  No assistance needed    Home Safety:  No safety concerns identified    Hearing Impairment:  No hearing concerns    In the past 6 months, have you been bothered by leaking of urine?  No    In general, how would you rate your overall mental or emotional health?  Good      PHQ-2 Total Score: 0    Additional concerns today:  No    Groin rash/drainage: Reports self-limiting left groin crustiness with possible purulent discharge that he thinks happens because he scratches himself there.  No symptoms currently.  No fevers.    Today's PHQ-2 Score:   PHQ-2 ( 1999 Pfizer) 8/24/2022   Q1: Little interest or pleasure in doing things 0   Q2: Feeling down, depressed or hopeless 0   PHQ-2 Score 0   PHQ-2 Total Score (12-17 Years)- Positive if 3 or more points; Administer PHQ-A if positive -   Q1: Little interest or pleasure in doing things Not at all   Q2: Feeling down, depressed or hopeless Not at all   PHQ-2 Score 0       Abuse: Current or Past(Physical, Sexual or Emotional)- No  Do you feel safe in your environment? Yes  Have you ever done Advance Care Planning? (For example, a Health Directive, POLST, or a discussion with a medical provider or your " loved ones about your wishes): No, advance care planning information given to patient to review.  Advanced care planning was discussed at today's visit.    Social History     Tobacco Use     Smoking status: Never Smoker     Smokeless tobacco: Never Used   Substance Use Topics     Alcohol use: No       Alcohol Use 8/24/2022   Prescreen: >3 drinks/day or >7 drinks/week? No     Last PSA: No results found for: PSA    Reviewed orders with patient. Reviewed health maintenance and updated orders accordingly - Yes  BP Readings from Last 3 Encounters:   08/24/22 126/85   08/04/21 131/88   11/30/20 129/88    Wt Readings from Last 3 Encounters:   08/24/22 96.7 kg (213 lb 3.2 oz)   08/04/21 97.2 kg (214 lb 3.2 oz)   11/30/20 97.3 kg (214 lb 6.4 oz)            Current Outpatient Medications   Medication Sig Dispense Refill     clonazePAM (KLONOPIN) 0.5 MG tablet Take 0.5 mg by mouth 2 times daily as needed       doxepin (SINEQUAN) 50 MG capsule Take 50 mg by mouth At Bedtime       famotidine (PEPCID) 20 MG tablet TAKE 1 TABLET BY MOUTH TWICE DAILY. 60 tablet 11     RANITIDINE 150 MAX STRENGTH 150 MG tablet Take 150 mg by mouth 2 times daily (Patient not taking: Reported on 8/4/2021)       risperiDONE (RISPERDAL) 4 MG tablet Take 4 mg by mouth 2 times daily       trihexyphenidyl (ARTANE) 5 MG tablet Take 5 mg by mouth At Bedtime       venlafaxine (EFFEXOR-XR) 150 MG 24 hr capsule Take 150 mg by mouth daily        venlafaxine (EFFEXOR-XR) 37.5 MG 24 hr capsule Take 37.5 mg by mouth daily       Recent Labs   Lab Test 08/05/22  1133 08/04/21  1054 07/08/20  1409 10/31/19  1336 04/15/19  1609   A1C 5.7*  --  5.4 5.5 5.2   * 115 140* 133* 150*   HDL 38* 37* 44.8 37* 42.3   TRIG 316* 212* 162.4*  --  203.5*   ALT  --  75 289.7*  --  385.3*   CR  --  1.20 1.0  --  1.1   GFRESTIMATED  --  80 >90  --  83.5   GFRESTBLACK  --   --  >90  --  >90   POTASSIUM  --  3.9 4.4  --  4.4        Reviewed and updated as needed this visit by  clinical staff   Tobacco  Allergies  Meds                Reviewed and updated as needed this visit by Provider                   Past Medical History:   Diagnosis Date     Autism disorder      Depressive disorder      Schizophrenia (H)       No past surgical history on file.    Review of Systems   Constitutional: Negative for chills and fever.   HENT: Negative for congestion, ear pain, hearing loss and sore throat.    Eyes: Negative for pain and visual disturbance.   Respiratory: Negative for cough and shortness of breath.    Cardiovascular: Negative for chest pain, palpitations and peripheral edema.   Gastrointestinal: Negative for abdominal pain, constipation, diarrhea, heartburn, hematochezia and nausea.   Genitourinary: Negative for dysuria, frequency, genital sores, hematuria, impotence, penile discharge and urgency.   Musculoskeletal: Negative for arthralgias, joint swelling and myalgias.   Skin: Negative for rash.   Neurological: Negative for dizziness, weakness, headaches and paresthesias.   Psychiatric/Behavioral: Negative for mood changes. The patient is not nervous/anxious.        OBJECTIVE:   /85 (BP Location: Left arm, Patient Position: Sitting, Cuff Size: Adult Large)   Pulse 111   Temp 98.5  F (36.9  C) (Oral)   Resp 16   Wt 96.7 kg (213 lb 3.2 oz)   SpO2 95%   BMI 33.86 kg/m      Physical Exam  GENERAL: healthy, alert and no distress  EYES: Eyes grossly normal to inspection, PERRL and conjunctivae and sclerae normal  HENT: ear canals and TM's normal, nose and mouth without ulcers or lesions  NECK: no adenopathy, no asymmetry, masses, or scars and thyroid normal to palpation  RESP: lungs clear to auscultation - no rales, rhonchi or wheezes  CV: regular rate and rhythm, normal S1 S2, no S3 or S4, no murmur, click or rub, no peripheral edema and peripheral pulses strong  ABDOMEN: soft, nontender, no hepatosplenomegaly, no masses and hypoactive bowel sounds normal  MS: no gross  musculoskeletal defects noted, no edema  SKIN: no suspicious lesions or rashes  NEURO: Normal strength and tone, mentation intact and speech normal  PSYCH: mentation appears normal, affect normal/bright    Diagnostic Test Results:  Labs reviewed in Epic    ASSESSMENT/PLAN:       1. Routine general medical examination at a health care facility  Discussed weight and lifestyle.  Briefly discussed weight loss meds, in which he is not currently interested.  His weight has been stable.  He does a lot of walking, which he was encouraged to continue doing.    2. High risk medication use  Follows with [facility] for psychiatric care.  No recent hospitalizations.  Discussed metabolic side effects of  - metFORMIN (GLUCOPHAGE XR) 500 MG 24 hr tablet; Take 1 tablet (500 mg) by mouth daily with food  Dispense: 90 Units; Refill: 3    3. Prediabetes  A1c 5.7, up from 5.4.  Could be related to medications.  Group home staff report no significant changes in eating habits.  Weight has been stable.  We will start low-dose metformin and check A1c in 6 to 12 months.  - metFORMIN (GLUCOPHAGE XR) 500 MG 24 hr tablet; Take 1 tablet (500 mg) by mouth daily with food  Dispense: 90 Units; Refill: 3    4. Tachycardia  Persistent, chronic since 2018.  He is asymptomatic and denies any palpitations or dyspnea.  No recent TSH, so we will check that today.  Hemoglobin checked last month was 15.  - TSH with free T4 reflex; Future  - TSH with free T4 reflex    5. Groin rash  Patient preferred a male provider so Dr. Monzon performed the exam and noted no abnormalities.  Patient was encouraged to return to clinic when his symptoms come back to be seen by a doctor.       Vital Signs 10/22/2015 8/4/2016 8/8/2017 8/3/2018 8/21/2018   Weight (LB)  181 lb 4 oz 192 lb 193 lb 197 lb     Vital Signs 4/15/2019 5/9/2019 7/8/2020 11/30/2020   Weight (LB) 210 lb 221 lb 9.6 oz 221 lb 6.4 oz 214 lb 6.4 oz     Vital Signs 8/4/2021 8/24/2022   Weight (LB)  "214 lb 3.2 oz 213 lb 3.2 oz       COUNSELING:   Reviewed preventive health counseling, as reflected in patient instructions  Special attention given to:        Regular exercise       Healthy diet/nutrition    Estimated body mass index is 33.86 kg/m  as calculated from the following:    Height as of 8/4/21: 1.69 m (5' 6.54\").    Weight as of this encounter: 96.7 kg (213 lb 3.2 oz).          Weight management plan: Discussed healthy diet and exercise guidelines And injectable weight loss medication    He reports that he has never smoked. He has never used smokeless tobacco.      Staffed with Dr. Nicolás Crawley MD  PGY3  [facility]  " Yes - the patient is able to be screened

## 2023-10-23 ENCOUNTER — TRANSFERRED RECORDS (OUTPATIENT)
Dept: HEALTH INFORMATION MANAGEMENT | Facility: CLINIC | Age: 33
End: 2023-10-23
Payer: COMMERCIAL

## 2024-01-23 ENCOUNTER — TRANSFERRED RECORDS (OUTPATIENT)
Dept: HEALTH INFORMATION MANAGEMENT | Facility: CLINIC | Age: 34
End: 2024-01-23
Payer: COMMERCIAL

## 2024-04-06 ENCOUNTER — TRANSFERRED RECORDS (OUTPATIENT)
Dept: HEALTH INFORMATION MANAGEMENT | Facility: CLINIC | Age: 34
End: 2024-04-06

## 2024-04-10 ENCOUNTER — MEDICAL CORRESPONDENCE (OUTPATIENT)
Dept: HEALTH INFORMATION MANAGEMENT | Facility: CLINIC | Age: 34
End: 2024-04-10
Payer: COMMERCIAL

## 2024-04-10 ENCOUNTER — TRANSFERRED RECORDS (OUTPATIENT)
Dept: HEALTH INFORMATION MANAGEMENT | Facility: CLINIC | Age: 34
End: 2024-04-10
Payer: COMMERCIAL

## 2024-04-11 ENCOUNTER — LAB (OUTPATIENT)
Dept: LAB | Facility: CLINIC | Age: 34
End: 2024-04-11
Payer: COMMERCIAL

## 2024-04-11 DIAGNOSIS — Z79.899 HIGH RISK MEDICATION USE: Primary | ICD-10-CM

## 2024-04-11 LAB — HBA1C MFR BLD: 5.3 % (ref 0–5.6)

## 2024-04-11 PROCEDURE — 84146 ASSAY OF PROLACTIN: CPT

## 2024-04-11 PROCEDURE — 83036 HEMOGLOBIN GLYCOSYLATED A1C: CPT

## 2024-04-11 PROCEDURE — 36415 COLL VENOUS BLD VENIPUNCTURE: CPT

## 2024-04-11 PROCEDURE — 82947 ASSAY GLUCOSE BLOOD QUANT: CPT

## 2024-04-11 PROCEDURE — 80061 LIPID PANEL: CPT

## 2024-04-12 LAB
CHOLEST SERPL-MCNC: 217 MG/DL
FASTING STATUS PATIENT QL REPORTED: ABNORMAL
FASTING STATUS PATIENT QL REPORTED: NORMAL
GLUCOSE SERPL-MCNC: 86 MG/DL (ref 70–99)
HDLC SERPL-MCNC: 36 MG/DL
LDLC SERPL CALC-MCNC: 128 MG/DL
NONHDLC SERPL-MCNC: 181 MG/DL
PROLACTIN SERPL 3RD IS-MCNC: 35 NG/ML (ref 4–15)
TRIGL SERPL-MCNC: 267 MG/DL

## 2024-04-15 NOTE — RESULT ENCOUNTER NOTE
Ingris Crawley MD  1/30/2023  9:51 AM CST Back to Top    Please send the following in a letter to the patient:      Dear Devan,    These are your test results.  Your Glucose level is normal.    Prolactin is 35, the lowest level in over 1 year.  This is helpful information for your psychiatrist.    The cholesterol numbers are improved.  I recommend checking these every year or so.    If you haven't already, please make an appointment to follow up in the clinic.    YOEL Haynes

## 2024-07-19 ENCOUNTER — DOCUMENTATION ONLY (OUTPATIENT)
Dept: FAMILY MEDICINE | Facility: CLINIC | Age: 34
End: 2024-07-19
Payer: COMMERCIAL

## 2024-07-19 NOTE — PROGRESS NOTES
To be completed in Nursing note:    Please reference list for forms that require a visit for completion.  Please remind patients that providers are given 3-5 business days to complete and return forms.      Form type: ICD-10 Codes    Date form received: 6/18/24    Date form completed by Physician:7/19/24    How was form returned to patient (mailed, faxed, or at  for patient to ): Fax to [employer] at [phone] atten: Haritha JAMES    Date form mailed/faxed/left at  for patient and sent to HIM for scanning: Faxed 7/19/24 at 9:08am      Once form is left for patient, faxed, or mailed [facility] will then close the documentation only encounter.

## 2024-08-09 ENCOUNTER — TRANSFERRED RECORDS (OUTPATIENT)
Dept: HEALTH INFORMATION MANAGEMENT | Facility: CLINIC | Age: 34
End: 2024-08-09
Payer: COMMERCIAL

## 2024-08-09 ENCOUNTER — MEDICAL CORRESPONDENCE (OUTPATIENT)
Dept: HEALTH INFORMATION MANAGEMENT | Facility: CLINIC | Age: 34
End: 2024-08-09
Payer: COMMERCIAL

## 2024-08-28 ENCOUNTER — OFFICE VISIT (OUTPATIENT)
Dept: FAMILY MEDICINE | Facility: CLINIC | Age: 34
End: 2024-08-28
Payer: COMMERCIAL

## 2024-08-28 ENCOUNTER — MEDICAL CORRESPONDENCE (OUTPATIENT)
Dept: HEALTH INFORMATION MANAGEMENT | Facility: CLINIC | Age: 34
End: 2024-08-28

## 2024-08-28 VITALS
BODY MASS INDEX: 35.31 KG/M2 | OXYGEN SATURATION: 96 % | TEMPERATURE: 98.2 F | WEIGHT: 233 LBS | RESPIRATION RATE: 20 BRPM | DIASTOLIC BLOOD PRESSURE: 75 MMHG | HEART RATE: 109 BPM | SYSTOLIC BLOOD PRESSURE: 110 MMHG | HEIGHT: 68 IN

## 2024-08-28 DIAGNOSIS — R73.03 PREDIABETES: ICD-10-CM

## 2024-08-28 DIAGNOSIS — E55.9 VITAMIN D INSUFFICIENCY: ICD-10-CM

## 2024-08-28 DIAGNOSIS — R05.1 ACUTE COUGH: ICD-10-CM

## 2024-08-28 DIAGNOSIS — E03.9 HYPOTHYROIDISM, UNSPECIFIED TYPE: ICD-10-CM

## 2024-08-28 DIAGNOSIS — K21.00 GASTROESOPHAGEAL REFLUX DISEASE WITH ESOPHAGITIS WITHOUT HEMORRHAGE: ICD-10-CM

## 2024-08-28 DIAGNOSIS — Z00.00 VISIT FOR PREVENTIVE HEALTH EXAMINATION: Primary | ICD-10-CM

## 2024-08-28 LAB — TSH SERPL DL<=0.005 MIU/L-ACNC: 1.41 UIU/ML (ref 0.3–4.2)

## 2024-08-28 PROCEDURE — 99395 PREV VISIT EST AGE 18-39: CPT | Mod: GC

## 2024-08-28 PROCEDURE — 84443 ASSAY THYROID STIM HORMONE: CPT

## 2024-08-28 PROCEDURE — 36415 COLL VENOUS BLD VENIPUNCTURE: CPT

## 2024-08-28 RX ORDER — VITAMIN B COMPLEX
25 TABLET ORAL DAILY
Qty: 90 TABLET | Refills: 3 | Status: SHIPPED | OUTPATIENT
Start: 2024-08-28

## 2024-08-28 RX ORDER — METFORMIN HCL 500 MG
500 TABLET, EXTENDED RELEASE 24 HR ORAL
Qty: 90 TABLET | Refills: 3 | Status: SHIPPED | OUTPATIENT
Start: 2024-08-28

## 2024-08-28 RX ORDER — GUAIFENESIN 600 MG/1
1200 TABLET, EXTENDED RELEASE ORAL 2 TIMES DAILY PRN
Qty: 30 TABLET | Refills: 1 | Status: SHIPPED | OUTPATIENT
Start: 2024-08-28

## 2024-08-28 RX ORDER — LEVOTHYROXINE SODIUM 125 UG/1
TABLET ORAL
Qty: 90 TABLET | Refills: 3 | Status: SHIPPED | OUTPATIENT
Start: 2024-08-28

## 2024-08-28 RX ORDER — FAMOTIDINE 20 MG/1
20 TABLET, FILM COATED ORAL DAILY PRN
Qty: 60 TABLET | Refills: 1 | Status: SHIPPED | OUTPATIENT
Start: 2024-08-28

## 2024-08-28 SDOH — HEALTH STABILITY: PHYSICAL HEALTH: ON AVERAGE, HOW MANY DAYS PER WEEK DO YOU ENGAGE IN MODERATE TO STRENUOUS EXERCISE (LIKE A BRISK WALK)?: 2 DAYS

## 2024-08-28 ASSESSMENT — SOCIAL DETERMINANTS OF HEALTH (SDOH): HOW OFTEN DO YOU GET TOGETHER WITH FRIENDS OR RELATIVES?: ONCE A WEEK

## 2024-08-28 NOTE — PROGRESS NOTES
"Preceptor Attestation:  Vitals:    08/28/24 1008   BP: 110/75   Pulse: 109   Resp: 20   Temp: 98.2  F (36.8  C)   TempSrc: Oral   SpO2: 96%   Weight: 105.7 kg (233 lb)   Height: 1.727 m (5' 8\")          I discussed the patient with the resident and evaluated the patient in person. I have verified the content of the note, which accurately reflects my assessment of the patient and the plan of care.   Supervising Physician:  Tatiana Isaacs MD    "

## 2024-08-28 NOTE — PROGRESS NOTES
Preventive Care Visit  [facility]  Ronald Hope DO, Family Medicine  Aug 28, 2024      Assessment & Plan     Visit for preventive health examination  -Patient presents for routine preventative health examination.  He is interested in having his chronic medications refilled.  He follows with [facility] for his mental health concerns. Cough is an acute concern.  -Group Home forms completed    Hypothyroidism, unspecified type  Will send in refills for levothyroxine and repeat TSH.  Denies any current symptoms of hypo or hyperthyroidism. Well controlled chronic condition. Most recent TSH was in normal range about 1 year ago.   - TSH WITH FREE T4 REFLEX  - levothyroxine (SYNTHROID/LEVOTHROID) 125 MCG tablet  Dispense: 90 tablet; Refill: 3    Vitamin D insufficiency  Patient has a history of vitamin D insufficiency.  Will send a refill of vitamin D supplementation  - Vitamin D3 (CHOLECALCIFEROL) 25 mcg (1000 units) tablet  Dispense: 90 tablet; Refill: 3    Prediabetes  Patient has a history of prediabetes.  Most recent hemoglobin A1c was 5.3.  Will send in a refill for metformin to pharmacy on file  - metFORMIN (GLUCOPHAGE XR) 500 MG 24 hr tablet  Dispense: 90 tablet; Refill: 3    Gastroesophageal reflux disease with esophagitis without hemorrhage  He has a history of GERD and takes as needed Pepcid.  States that he really has not taken this in quite some time.  I will send a limited refill of this medication to his pharmacy on file and he can follow-up on an as-needed basis if symptoms worsen or fail to improve  - famotidine (PEPCID) 20 MG tablet  Dispense: 60 tablet; Refill: 1    Acute cough  Discussed with patient that his acute cough appears most consistent with the sequela of a viral upper respiratory tract infection.  I will send in short course of Mucinex that he can take as needed for congestion.  He verbalized clear understanding and agreement to this treatment plan and had no  "further questions or concerns at this time. Discussed that he can follow-up as needed of symptoms worsen or fail to improve with conservative management.   -     guaiFENesin (MUCINEX) 600 MG 12 hr tablet; Take 2 tablets (1,200 mg) by mouth 2 times daily as needed for congestion.          The longitudinal plan of care for the diagnosis(es)/condition(s) as documented were addressed during this visit. Due to the added complexity in care, I will continue to support Devan in the subsequent management and with ongoing continuity of care.      BMI  Estimated body mass index is 35.43 kg/m  as calculated from the following:    Height as of this encounter: 1.727 m (5' 8\").    Weight as of this encounter: 105.7 kg (233 lb).       Counseling  Appropriate preventive services were addressed with this patient via screening, questionnaire, or discussion as appropriate for fall prevention, nutrition, physical activity, Tobacco-use cessation, social engagement, weight loss and cognition.  Checklist reviewing preventive services available has been given to the patient.  Reviewed patient's diet, addressing concerns and/or questions.   He is at risk for lack of exercise and has been provided with information to increase physical activity for the benefit of his well-being.   He is at risk for psychosocial distress and has been provided with information to reduce risk.   Updated plan of care.  Patient reported difficulty with activities of daily living were addressed today.Addressed any concerns about safety while driving.  The patient was provided with written information regarding signs of hearing loss.       Return in about 53 months (around 1/28/2029).    Subjective   Devan is a 33 year old, presenting for the following:  Physical        8/28/2024    10:06 AM   Additional Questions   Roomed by Mao   Accompanied by self         8/28/2024   Forms   Any forms needing to be completed Yes          8/28/2024    Information "    services provided? No      HPI  Besides a cough that has been present for the last 1 to 2 weeks, he denies any acute concerns.  Denies headache, fever, chills, nausea, vomiting, shortness of breath, chest pain.  Reports no issues with his medication adherence.  He we will work on improving his diet and exercise. Lives in a group home. Denies hearing or vision concerns.        8/28/2024   General Health   How would you rate your overall physical health? Good   Feel stress (tense, anxious, or unable to sleep) Only a little      (!) STRESS CONCERN      8/28/2024   Nutrition   Diet: Regular (no restrictions)            8/28/2024   Exercise   Days per week of moderate/strenous exercise 2 days      (!) EXERCISE CONCERN      8/28/2024   Social Factors   Frequency of gathering with friends or relatives Once a week   Worry food won't last until get money to buy more No   Food not last or not have enough money for food? No   Do you have housing? (Housing is defined as stable permanent housing and does not include staying ouside in a car, in a tent, in an abandoned building, in an overnight shelter, or couch-surfing.) Yes   Are you worried about losing your housing? No   Lack of transportation? No   Unable to get utilities (heat,electricity)? No            8/28/2024   Fall Risk   Fallen 2 or more times in the past year? No   Trouble with walking or balance? No             8/28/2024   Activities of Daily Living- Home Safety   Needs help with the following daily activites Telephone use    Transportation    Shopping    Bathing    Laundry   Safety concerns in the home None of the above       Multiple values from one day are sorted in reverse-chronological order         8/28/2024   Dental   Dentist two times every year? (!) DECLINE            8/28/2024   Hearing Screening   Hearing concerns? (!) TROUBLE UNDESTANDING A SPEAKER IN A PUBLIC MEETING OR [employer] SERVICE.    None of the above       Multiple values from one  day are sorted in reverse-chronological order         8/28/2024   Driving Risk Screening   Patient/family members have concerns about driving (!) YES             8/28/2024   General Alertness/Fatigue Screening   Have you been more tired than usual lately? No            8/28/2024   Urinary Incontinence Screening   Bothered by leaking urine in past 6 months No            8/28/2024   TB Screening   Were you born outside of the [place]? Yes            Today's PHQ-2 Score:       8/28/2024    10:07 AM   PHQ-2 ( 1999 [employer])   Q1: Little interest or pleasure in doing things 0   Q2: Feeling down, depressed or hopeless 0   PHQ-2 Score 0           8/28/2024   Substance Use   Alcohol more than 3/day or more than 7/wk No   Do you have a current opioid prescription? No   How severe/bad is pain from 1 to 10? 0/10 (No Pain)   Do you use any other substances recreationally? No        Social History     Tobacco Use    Smoking status: Never    Smokeless tobacco: Never   Vaping Use    Vaping status: Never Used   Substance Use Topics    Alcohol use: No    Drug use: No             8/28/2024   Contraception/Family Planning   Questions about contraception or family planning No        Reviewed and updated as needed this visit by Provider                      Current providers sharing in care for this patient include:  Patient Care Team:  Ronald Hope DO as PCP - General (Student in organized health care education/training program)  Luanne Perez as   Iris Camacho APRN CNS (Clinical Nurse Specialist)  eVronica Joyner NP as Nurse Practitioner (Nurse Practitioner)  Myles George MD as MD (Psychiatry)  Ronald Hope DO as Assigned PCP    The following health maintenance items are reviewed in Epic and correct as of today:  Health Maintenance   Topic Date Due    COVID-19 Vaccine (5 - 2023-24 season) 09/01/2023    TSH W/FREE T4 REFLEX  02/08/2024    INFLUENZA VACCINE (1) 09/01/2024    LIPID  04/11/2025     "MEDICARE ANNUAL WELLNESS VISIT  08/28/2025    GLUCOSE  04/11/2027    ADVANCE CARE PLANNING  08/25/2027    DTAP/TDAP/TD IMMUNIZATION (9 - Td or Tdap) 08/24/2032    HEPATITIS C SCREENING  Completed    HIV SCREENING  Completed    PHQ-2 (once per calendar year)  Completed    HEPATITIS B IMMUNIZATION  Completed    Pneumococcal Vaccine: Pediatrics (0 to 5 Years) and At-Risk Patients (6 to 64 Years)  Aged Out    HPV IMMUNIZATION  Aged Out    MENINGITIS IMMUNIZATION  Aged Out    RSV MONOCLONAL ANTIBODY  Aged Out         Review of Systems  Constitutional, HEENT, cardiovascular, pulmonary, gi and gu systems are negative, except as otherwise noted.  ROS reviewed, see HPI for pertinent positives and negatives.  Ronald Hope, DO       Objective    Exam  /75   Pulse 109   Temp 98.2  F (36.8  C) (Oral)   Resp 20   Ht 1.727 m (5' 8\")   Wt 105.7 kg (233 lb)   SpO2 96%   BMI 35.43 kg/m     Estimated body mass index is 35.43 kg/m  as calculated from the following:    Height as of this encounter: 1.727 m (5' 8\").    Weight as of this encounter: 105.7 kg (233 lb).    Physical Exam  Constitutional:       General: He is not in acute distress.     Appearance: He is not toxic-appearing or diaphoretic.   HENT:      Head: Normocephalic and atraumatic.      Right Ear: External ear normal.      Left Ear: External ear normal.      Nose: Nose normal.      Mouth/Throat:      Mouth: Mucous membranes are moist.   Cardiovascular:      Rate and Rhythm: Normal rate and regular rhythm.      Heart sounds: No murmur heard.     No friction rub. No gallop.   Pulmonary:      Effort: No respiratory distress.      Breath sounds: No wheezing or rales.      Comments: Cough present  Chest:      Chest wall: No tenderness.   Abdominal:      General: Bowel sounds are normal. There is no distension.      Tenderness: There is no abdominal tenderness. There is no rebound.   Skin:     Findings: No rash.   Neurological:      Mental Status: He is alert and " oriented to person, place, and time. Mental status is at baseline.               8/28/2024   Mini Cog   Clock Draw Score 2 Normal   3 Item Recall 1 object recalled   Mini Cog Total Score 3             Precepted with Ferny Mead MD who agrees with assessment and plan as outlined above    Signed Electronically by: Ronald Hope DO

## 2024-12-02 ENCOUNTER — TRANSFERRED RECORDS (OUTPATIENT)
Dept: HEALTH INFORMATION MANAGEMENT | Facility: CLINIC | Age: 34
End: 2024-12-02
Payer: COMMERCIAL

## 2025-02-28 ENCOUNTER — TRANSFERRED RECORDS (OUTPATIENT)
Dept: HEALTH INFORMATION MANAGEMENT | Facility: CLINIC | Age: 35
End: 2025-02-28
Payer: COMMERCIAL

## 2025-08-25 ENCOUNTER — OFFICE VISIT (OUTPATIENT)
Dept: FAMILY MEDICINE | Facility: CLINIC | Age: 35
End: 2025-08-25
Payer: COMMERCIAL

## 2025-08-25 ENCOUNTER — RESULTS FOLLOW-UP (OUTPATIENT)
Dept: FAMILY MEDICINE | Facility: CLINIC | Age: 35
End: 2025-08-25

## 2025-08-25 VITALS
HEART RATE: 96 BPM | SYSTOLIC BLOOD PRESSURE: 124 MMHG | BODY MASS INDEX: 38.32 KG/M2 | TEMPERATURE: 98.3 F | OXYGEN SATURATION: 95 % | WEIGHT: 252 LBS | DIASTOLIC BLOOD PRESSURE: 82 MMHG | RESPIRATION RATE: 20 BRPM

## 2025-08-25 DIAGNOSIS — E66.812 CLASS 2 OBESITY DUE TO EXCESS CALORIES WITHOUT SERIOUS COMORBIDITY WITH BODY MASS INDEX (BMI) OF 38.0 TO 38.9 IN ADULT: ICD-10-CM

## 2025-08-25 DIAGNOSIS — R00.0 TACHYCARDIA: ICD-10-CM

## 2025-08-25 DIAGNOSIS — R73.03 PREDIABETES: ICD-10-CM

## 2025-08-25 DIAGNOSIS — E03.9 HYPOTHYROIDISM, UNSPECIFIED TYPE: ICD-10-CM

## 2025-08-25 DIAGNOSIS — K21.00 GASTROESOPHAGEAL REFLUX DISEASE WITH ESOPHAGITIS WITHOUT HEMORRHAGE: ICD-10-CM

## 2025-08-25 DIAGNOSIS — F20.0 SCHIZOPHRENIA, PARANOID, CHRONIC (H): Chronic | ICD-10-CM

## 2025-08-25 DIAGNOSIS — Z79.899 LONG-TERM USE OF HIGH-RISK MEDICATION: ICD-10-CM

## 2025-08-25 DIAGNOSIS — Z23 ENCOUNTER FOR ADMINISTRATION OF COVID-19 VACCINE: ICD-10-CM

## 2025-08-25 DIAGNOSIS — Z13.220 SCREENING FOR LIPID DISORDERS: ICD-10-CM

## 2025-08-25 DIAGNOSIS — E66.09 CLASS 2 OBESITY DUE TO EXCESS CALORIES WITHOUT SERIOUS COMORBIDITY WITH BODY MASS INDEX (BMI) OF 38.0 TO 38.9 IN ADULT: ICD-10-CM

## 2025-08-25 DIAGNOSIS — Z00.00 ENCOUNTER FOR ANNUAL WELLNESS VISIT: Primary | ICD-10-CM

## 2025-08-25 DIAGNOSIS — E55.9 VITAMIN D INSUFFICIENCY: ICD-10-CM

## 2025-08-25 DIAGNOSIS — K76.0 HEPATIC STEATOSIS: ICD-10-CM

## 2025-08-25 LAB
ALBUMIN SERPL BCG-MCNC: 4.4 G/DL (ref 3.5–5.2)
ALP SERPL-CCNC: 105 U/L (ref 40–150)
ALT SERPL W P-5'-P-CCNC: 291 U/L (ref 0–70)
ANION GAP SERPL CALCULATED.3IONS-SCNC: 9 MMOL/L (ref 7–15)
AST SERPL W P-5'-P-CCNC: 170 U/L (ref 0–45)
BASOPHILS # BLD AUTO: 0.05 10E3/UL (ref 0–0.2)
BASOPHILS NFR BLD AUTO: 0.7 %
BILIRUB SERPL-MCNC: 0.5 MG/DL
BUN SERPL-MCNC: 10.8 MG/DL (ref 6–20)
CALCIUM SERPL-MCNC: 9.9 MG/DL (ref 8.8–10.4)
CHLORIDE SERPL-SCNC: 103 MMOL/L (ref 98–107)
CHOLEST SERPL-MCNC: 220 MG/DL
CREAT SERPL-MCNC: 0.98 MG/DL (ref 0.67–1.17)
EGFRCR SERPLBLD CKD-EPI 2021: >90 ML/MIN/1.73M2
EOSINOPHIL # BLD AUTO: 0.28 10E3/UL (ref 0–0.7)
EOSINOPHIL NFR BLD AUTO: 3.8 %
ERYTHROCYTE [DISTWIDTH] IN BLOOD BY AUTOMATED COUNT: 12.5 % (ref 10–15)
EST. AVERAGE GLUCOSE BLD GHB EST-MCNC: 108 MG/DL
FASTING STATUS PATIENT QL REPORTED: ABNORMAL
FASTING STATUS PATIENT QL REPORTED: ABNORMAL
GLUCOSE SERPL-MCNC: 103 MG/DL (ref 70–99)
HBA1C MFR BLD: 5.4 % (ref 0–5.6)
HCO3 SERPL-SCNC: 27 MMOL/L (ref 22–29)
HCT VFR BLD AUTO: 43.7 % (ref 40–53)
HDLC SERPL-MCNC: 48 MG/DL
HGB BLD-MCNC: 15.1 G/DL (ref 13.3–17.7)
IMM GRANULOCYTES # BLD: <0.03 10E3/UL
IMM GRANULOCYTES NFR BLD: 0.3 %
LDLC SERPL CALC-MCNC: 141 MG/DL
LYMPHOCYTES # BLD AUTO: 2.53 10E3/UL (ref 0.8–5.3)
LYMPHOCYTES NFR BLD AUTO: 34.5 %
MCH RBC QN AUTO: 30.3 PG (ref 26.5–33)
MCHC RBC AUTO-ENTMCNC: 34.6 G/DL (ref 31.5–36.5)
MCV RBC AUTO: 87.6 FL (ref 78–100)
MONOCYTES # BLD AUTO: 0.86 10E3/UL (ref 0–1.3)
MONOCYTES NFR BLD AUTO: 11.7 %
NEUTROPHILS # BLD AUTO: 3.6 10E3/UL (ref 1.6–8.3)
NEUTROPHILS NFR BLD AUTO: 49 %
NONHDLC SERPL-MCNC: 172 MG/DL
NRBC # BLD AUTO: <0.03 10E3/UL
NRBC BLD AUTO-RTO: 0 /100
PLATELET # BLD AUTO: 223 10E3/UL (ref 150–450)
POTASSIUM SERPL-SCNC: 4.3 MMOL/L (ref 3.4–5.3)
PROT SERPL-MCNC: 7.9 G/DL (ref 6.4–8.3)
RBC # BLD AUTO: 4.99 10E6/UL (ref 4.4–5.9)
SODIUM SERPL-SCNC: 139 MMOL/L (ref 135–145)
T4 FREE SERPL-MCNC: 1.25 NG/DL (ref 0.9–1.7)
TRIGL SERPL-MCNC: 156 MG/DL
TSH SERPL DL<=0.005 MIU/L-ACNC: 9.94 UIU/ML (ref 0.3–4.2)
VIT B12 SERPL-MCNC: 579 PG/ML (ref 232–1245)
WBC # BLD AUTO: 7.34 10E3/UL (ref 4–11)

## 2025-08-25 RX ORDER — METFORMIN HYDROCHLORIDE 500 MG/1
500 TABLET, EXTENDED RELEASE ORAL
Qty: 90 TABLET | Refills: 3 | Status: SHIPPED | OUTPATIENT
Start: 2025-08-25

## 2025-08-25 RX ORDER — VITAMIN B COMPLEX
25 TABLET ORAL DAILY
Qty: 90 TABLET | Refills: 3 | Status: SHIPPED | OUTPATIENT
Start: 2025-08-25

## 2025-08-25 RX ORDER — FAMOTIDINE 20 MG/1
20 TABLET, FILM COATED ORAL DAILY PRN
Qty: 60 TABLET | Refills: 1 | Status: SHIPPED | OUTPATIENT
Start: 2025-08-25

## 2025-08-25 RX ORDER — LEVOTHYROXINE SODIUM 125 UG/1
TABLET ORAL
Qty: 90 TABLET | Refills: 3 | Status: SHIPPED | OUTPATIENT
Start: 2025-08-25

## 2025-08-25 SDOH — HEALTH STABILITY: PHYSICAL HEALTH: ON AVERAGE, HOW MANY DAYS PER WEEK DO YOU ENGAGE IN MODERATE TO STRENUOUS EXERCISE (LIKE A BRISK WALK)?: 0 DAYS

## 2025-08-27 ENCOUNTER — TRANSFERRED RECORDS (OUTPATIENT)
Dept: HEALTH INFORMATION MANAGEMENT | Facility: CLINIC | Age: 35
End: 2025-08-27
Payer: COMMERCIAL